# Patient Record
Sex: MALE | Race: WHITE | NOT HISPANIC OR LATINO | Employment: FULL TIME | URBAN - METROPOLITAN AREA
[De-identification: names, ages, dates, MRNs, and addresses within clinical notes are randomized per-mention and may not be internally consistent; named-entity substitution may affect disease eponyms.]

---

## 2017-12-07 ENCOUNTER — TELEPHONE (OUTPATIENT)
Dept: HEMATOLOGY/ONCOLOGY | Facility: CLINIC | Age: 42
End: 2017-12-07

## 2019-02-11 ENCOUNTER — TELEPHONE (OUTPATIENT)
Dept: HEMATOLOGY/ONCOLOGY | Facility: CLINIC | Age: 44
End: 2019-02-11

## 2019-02-11 NOTE — TELEPHONE ENCOUNTER
"Per Dr Martinez, ok to schedule this Wed 3pm.  Mr. Rodriguez agreed to sched.  Gave directions.  Per pt, he is establish care -- no issues at this time.  Mentioned to him that the records we have cover 1995, 1996 & 2003.  He said we are not missing anything.  9-27-95:  neck LN bx "favors Hodgkins"                 Had ABVD chemo then XRT from 4/9/96 to 5/8/96.  3-10-03   Follow up with  Onc, Dr Alex Maddox.  Last seen Sept 2002.                 Ordered PET for LN in neck.  3-23-03  Neck LN bx benign.    ===============================================    ----- Message -----  From: Libertad Colon  Sent: 2/11/2019  10:19 AM  To: Dirk Toney RN, [facility]    Pt is on the board here at [facility]. Requesting to see Dr. Tyler Martinez on either a Monday, Tues, or Wed from 2-5 is possible. He said that in 2017 he dropped off his records from  to either the nurse here or . He wanted to know if you all still had those on file? Please call him to schedule at [phone]. Thank you!        "

## 2019-02-13 ENCOUNTER — INITIAL CONSULT (OUTPATIENT)
Dept: HEMATOLOGY/ONCOLOGY | Facility: CLINIC | Age: 44
End: 2019-02-13
Payer: COMMERCIAL

## 2019-02-13 VITALS
DIASTOLIC BLOOD PRESSURE: 67 MMHG | SYSTOLIC BLOOD PRESSURE: 115 MMHG | OXYGEN SATURATION: 97 % | WEIGHT: 214.75 LBS | HEART RATE: 71 BPM | HEIGHT: 69 IN | BODY MASS INDEX: 31.81 KG/M2 | TEMPERATURE: 98 F

## 2019-02-13 DIAGNOSIS — C81.10 NODULAR SCLEROSING HODGKIN'S LYMPHOMA, UNSPECIFIED BODY REGION: ICD-10-CM

## 2019-02-13 DIAGNOSIS — Z92.3 HISTORY OF RADIATION EXPOSURE: ICD-10-CM

## 2019-02-13 DIAGNOSIS — Z92.21 HISTORY OF CHEMOTHERAPY: Primary | ICD-10-CM

## 2019-02-13 DIAGNOSIS — E03.9 HYPOTHYROIDISM, UNSPECIFIED TYPE: ICD-10-CM

## 2019-02-13 PROCEDURE — 99999 PR PBB SHADOW E&M-EST. PATIENT-LVL III: ICD-10-PCS | Mod: PBBFAC,,, | Performed by: INTERNAL MEDICINE

## 2019-02-13 PROCEDURE — 3008F BODY MASS INDEX DOCD: CPT | Mod: CPTII,S$GLB,, | Performed by: INTERNAL MEDICINE

## 2019-02-13 PROCEDURE — 3008F PR BODY MASS INDEX (BMI) DOCUMENTED: ICD-10-PCS | Mod: CPTII,S$GLB,, | Performed by: INTERNAL MEDICINE

## 2019-02-13 PROCEDURE — 99999 PR PBB SHADOW E&M-EST. PATIENT-LVL III: CPT | Mod: PBBFAC,,, | Performed by: INTERNAL MEDICINE

## 2019-02-13 PROCEDURE — 99205 OFFICE O/P NEW HI 60 MIN: CPT | Mod: S$GLB,,, | Performed by: INTERNAL MEDICINE

## 2019-02-13 PROCEDURE — 99205 PR OFFICE/OUTPT VISIT, NEW, LEVL V, 60-74 MIN: ICD-10-PCS | Mod: S$GLB,,, | Performed by: INTERNAL MEDICINE

## 2019-02-13 NOTE — PROGRESS NOTES
SECTION OF HEMATOLOGY AND BONE MARROW TRANSPLANT  New Patient Visit   02/15/2019  Referred by:  Moshe Self  Referred for:     CHIEF COMPLAINT:   Chief Complaint   Patient presents with    Lymphoma remission       HISTORY OF PRESENT ILLNESS:   43 y.o.  male pmh of NSCHL, stage IIB diagnosed in 1996; s/p 6 cycles ABVD followed by consolidative mediastinal XRT; his treatment was at [facility] (medical oncologist - Dr. Alex billy, radiation oncologist - Dr. Dillon North).  Relatively uncomplicated treatment course.  Was under serial observation since that time with no relapse.   He has post XRT hypothyroidism and is currently on synthroid.   Has had intermittent oncologic care since 5 years post malignancy .  Follows regularly with a PCP at , Dr. Ceballos.   Denies fever, chills, nightsweats, bleeding, brusing, lymphadenopathy, signs/symptoms of splenomegaly.   Denies any cardiorespiratory symptoms. Excercises.  Notes some weight gain due to less exercise related to pain from hernia surgery 4 years ago.  . .  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  Comes to clinic alone.    PAST MEDICAL HISTORY:   Past Medical History:   Diagnosis Date    Thyroid disease        PAST SURGICAL HISTORY:   Past Surgical History:   Procedure Laterality Date    HERNIA REPAIR         PAST SOCIAL HISTORY:   reports that  has never smoked. He does not have any smokeless tobacco history on file. He reports that he does not drink alcohol.    FAMILY HISTORY:  History reviewed. No pertinent family history.    CURRENT MEDICATIONS:   Current Outpatient Medications   Medication Sig    levothyroxine (SYNTHROID) 100 MCG tablet TK 1 T PO QD     No current facility-administered medications for this visit.      ALLERGIES:   Review of patient's allergies indicates:  No Known Allergies          REVIEW OF SYSTEMS:   General ROS: negative  Psychological ROS: negative  Ophthalmic ROS: negative  ENT ROS:  negative  Allergy and Immunology ROS: negative  Hematological and Lymphatic ROS: negative  Endocrine ROS: negative  Respiratory ROS: negative  Cardiovascular ROS: negative  Gastrointestinal ROS: negative  Genito-Urinary ROS: negative  Musculoskeletal ROS: see HPI  Neurological ROS: negative  Dermatological ROS: negative    PHYSICAL EXAM:   Vitals:    02/13/19 1603   BP: 115/67   Pulse: 71   Temp: 98.4 °F (36.9 °C)       General - well developed, well nourished, no apparent distress  Head & Face - no sinus tenderness  Eyes - normal conjunctivae and lids   ENT - normal external auditory canals and tympanic membranes bilaterally oropharynx clear,  Normal dentition and gums  Neck - normal thyroid  Chest and Lung - normal respiratory effort, clear to auscultation bilaterally   Cardiovascular - RRR with no MGR, normal S1 and S2; no pedal edema  Abdomen -  soft, nontender, no palpable hepatomegaly or splenomegaly  Lymph - no palpable lymphadenopathy  Extremities - unremarkable nails and digits  Heme - no bruising, petechiae, pallor  Skin - no rashes or lesions  Psych - appropriate mood and affect      ECOG Performance Status: (foot note - ECOG PS provided by Eastern Cooperative Oncology Group) 0 - Asymptomatic    Karnofsky Performance Score:  100%- Normal, No Complaints, No Evidence of Disease  DATA:   See epic media   [facility] labs show normal cbc, cmp, FLP, testosterone, thryoids studies, hgb a1c from feb 2018 ; due for annual   repeat this month     ASSESSMENT AND PLAN:   Encounter Diagnoses   Name Primary?    History of chemotherapy Yes    Hypothyroidism, unspecified type     Nodular sclerosing Hodgkin's lymphoma, unspecified body region     History of radiation exposure      -Remote history stage IIB NSCLH dx 1996 s/p ABVD x 6 and 4000cg XRT to med/supraclav area; clinical remission of his disease continues; explained he is cured of his disease  -long discussion at this point regarding need for monitoring for late term  chemotherapy and radiation toxicities, particularly cardiovascular disease and monitoring for therapy related myeloid disorders  -with regard to CV health discussed need to optimize modifiable CV risk factors (HTN, cholesterol, DM2, nonsmoker status, healthy weight, exercise/diet)  -he is active with no cardiorespiratory symptoms currently   -he had normal CBC feb 2018; recommend annual CBC for  therapy related myeloid disorders monitoring   -for his XRT related synthroid will continue current synthroid dose   -he has asked me to assume care of him for late effects monitoring /new pcp which I have agreed to do  -he is due for annual cbc, cmp, tsh, free t4, testosterone, FLP, and HgA1C    Follow Up: -labs as above to be drawn in next month  -same labs and MD appt in 1 year   Sarwat Martinez MD  Hematology/Oncology/Bone Marrow Transplant

## 2019-02-13 NOTE — PROGRESS NOTES
SECTION OF HEMATOLOGY AND BONE MARROW TRANSPLANT  New Patient Visit   02/13/2019  Referred by:  Moshe Self  Referred for: ***    CHIEF COMPLAINT: No chief complaint on file.      HISTORY OF PRESENT ILLNESS:     PAST MEDICAL HISTORY:   Past Medical History:   Diagnosis Date    Thyroid disease        PAST SURGICAL HISTORY:   Past Surgical History:   Procedure Laterality Date    HERNIA REPAIR         PAST SOCIAL HISTORY:   reports that  has never smoked. He does not have any smokeless tobacco history on file. He reports that he does not drink alcohol.    FAMILY HISTORY:  No family history on file.    CURRENT MEDICATIONS:   Current Outpatient Medications   Medication Sig    levothyroxine (SYNTHROID) 100 MCG tablet TK 1 T PO QD     No current facility-administered medications for this visit.      ALLERGIES:   Review of patient's allergies indicates:  No Known Allergies          REVIEW OF SYSTEMS:   General ROS: {rosgen:094075}  Psychological ROS: {ros psych:844872}  Ophthalmic ROS: {ros eyes:810560}  ENT ROS: {rosent:160448}  Allergy and Immunology ROS: {ros all/imm:567515}  Hematological and Lymphatic ROS: {rosheme/lymph:042659}  Endocrine ROS: {qi:093405}  Respiratory ROS: {ros resp:842937}  Cardiovascular ROS: {roscv:868126}  Gastrointestinal ROS: {ros gi:586211}  Genito-Urinary ROS: {rosgu:709652}  Musculoskeletal ROS: {ros musculoskeletal:258680}  Neurological ROS: {rosneuro:034570}  Dermatological ROS: {ros; skin:835202}    PHYSICAL EXAM:   There were no vitals filed for this visit.    General - well developed, well nourished, no apparent distress  Head & Face - no sinus tenderness  Eyes - normal conjunctivae and lids   ENT - normal external auditory canals and tympanic membranes bilaterally oropharynx clear,  Normal dentition and gums  Neck - normal thyroid  Chest and Lung - normal respiratory effort, clear to auscultation bilaterally   Cardiovascular - RRR with no MGR, normal S1 and S2; no pedal  edema  Abdomen -  soft, nontender, no palpable hepatomegaly or splenomegaly  Lymph - no palpable lymphadenopathy  Extremities - unremarkable nails and digits  Heme - no bruising, petechiae, pallor  Skin - no rashes or lesions  Psych - appropriate mood and affect      ECOG Performance Status: (foot note - ECOG PS provided by Eastern Cooperative Oncology Group) {performance status:19948}    Karnofsky Performance Score:  {KARNOFSKY SCORE:73180}  DATA:   {ID Data Review:88935}    ASSESSMENT AND PLAN:   No diagnosis found.    Follow Up: No Follow-up on file.    Sarwat Martinez MD  Hematology/Oncology/Bone Marrow Transplant

## 2019-05-02 ENCOUNTER — OFFICE VISIT (OUTPATIENT)
Dept: ENDOCRINOLOGY | Facility: CLINIC | Age: 44
End: 2019-05-02
Payer: COMMERCIAL

## 2019-05-02 VITALS
BODY MASS INDEX: 31.44 KG/M2 | DIASTOLIC BLOOD PRESSURE: 58 MMHG | HEART RATE: 92 BPM | HEIGHT: 69 IN | RESPIRATION RATE: 18 BRPM | WEIGHT: 212.31 LBS | SYSTOLIC BLOOD PRESSURE: 118 MMHG

## 2019-05-02 DIAGNOSIS — E03.9 ACQUIRED HYPOTHYROIDISM: Primary | ICD-10-CM

## 2019-05-02 DIAGNOSIS — G47.33 OBSTRUCTIVE SLEEP APNEA: ICD-10-CM

## 2019-05-02 DIAGNOSIS — E29.1 MALE HYPOGONADISM: ICD-10-CM

## 2019-05-02 DIAGNOSIS — K90.0 CELIAC DISEASE: ICD-10-CM

## 2019-05-02 PROCEDURE — 3008F BODY MASS INDEX DOCD: CPT | Mod: CPTII,S$GLB,, | Performed by: INTERNAL MEDICINE

## 2019-05-02 PROCEDURE — 3008F PR BODY MASS INDEX (BMI) DOCUMENTED: ICD-10-PCS | Mod: CPTII,S$GLB,, | Performed by: INTERNAL MEDICINE

## 2019-05-02 PROCEDURE — 99204 OFFICE O/P NEW MOD 45 MIN: CPT | Mod: S$GLB,,, | Performed by: INTERNAL MEDICINE

## 2019-05-02 PROCEDURE — 99999 PR PBB SHADOW E&M-EST. PATIENT-LVL III: CPT | Mod: PBBFAC,,, | Performed by: INTERNAL MEDICINE

## 2019-05-02 PROCEDURE — 99204 PR OFFICE/OUTPT VISIT, NEW, LEVL IV, 45-59 MIN: ICD-10-PCS | Mod: S$GLB,,, | Performed by: INTERNAL MEDICINE

## 2019-05-02 PROCEDURE — 99999 PR PBB SHADOW E&M-EST. PATIENT-LVL III: ICD-10-PCS | Mod: PBBFAC,,, | Performed by: INTERNAL MEDICINE

## 2019-05-02 RX ORDER — PANTOPRAZOLE SODIUM 20 MG/1
20 TABLET, DELAYED RELEASE ORAL DAILY
COMMUNITY
End: 2019-07-24

## 2019-05-02 NOTE — PROGRESS NOTES
Subjective:      Patient ID: Ivan Rodriguez is a 44 y.o. male.    Chief Complaint:  Hypothyroidism and Hypogonadism      History of Present Illness  Mr. Rodriguez presents for evaluation and management of hypothyroidism and hypogonadism.     Has active history of hypothyoridism, Celiac's disease and hypogonadism, and past history of nodular sclerosing Hodgkin's lymphoma that is cured.     NSCHL, stage IIB diagnosed in 1996; s/p 6 cycles ABVD followed by consolidative mediastinal XRT; his treatment was at [facility] (medical oncologist - Dr. Alex billy, radiation oncologist - Dr. Dillon North).    In regards to hypothyroidism:  Felt to be related to XRT therapy for treatment of Hodgkin's lymphoma. Has been on thyroid hormone for several years.   His current thyroid hormone dose is either 125 mcg or 137 mcg. He is unsure of the dose and will let me know.   He takes thyroid hormone on empty stomach and doesn't miss any doses.   Reports that his dose has escalated gradually over the past few years, coinciding with a more recent diagnosis of Celiac's disease. He is unsure if he was ever tested for Hashimoto's.    In regards to hypogonadism:  Diagnosis was made by Dr. Melara at . Reports having full workup there that included a pituitary MRI.   He was recently  and is actively trying to have children so he was started on Clomid.   Current Clomid dose is 50 mg once daily.   Recent T level off of Clomid was 300 and while on Clomid 528.  He reports that the hypogonadal symptoms started after he had complications from a hernia surgery and gained weight.   The symptoms are improved while on Clomid.  He also had semen analysis done which was normal.   No illicit drug use. No supplements or creams. No lavender or tea tree oil. Prolactin level was normal.     In regards to Celiac's disease:  Diagnosed by gastroenterology at  with small bowel biopsy and positive antibodies confirming diagnosis.   He has been doing his best  to adhere to a gluten free diet.     Also with CARLOS and wears CPAP regularly.     Review of Systems   Constitutional: Negative for unexpected weight change.   Eyes: Negative for visual disturbance.   Respiratory: Negative for shortness of breath.    Cardiovascular: Negative for chest pain.   Gastrointestinal: Negative for abdominal pain.   Endocrine: Negative for cold intolerance.   Genitourinary: Negative for frequency.   Musculoskeletal: Negative for arthralgias.   Skin: Negative for rash.   Neurological: Negative for headaches.       Objective:   Physical Exam   Constitutional: He is oriented to person, place, and time. He appears well-developed and well-nourished.   HENT:   Head: Normocephalic and atraumatic.   Right Ear: External ear normal.   Left Ear: External ear normal.   Nose: Nose normal.   Neck: No tracheal deviation present. No thyromegaly present.   Cardiovascular: Normal rate, regular rhythm and normal heart sounds.   No edema   Pulmonary/Chest: Effort normal and breath sounds normal.   Abdominal: Soft. Bowel sounds are normal. There is no tenderness.   Musculoskeletal:   Normal gait, no cyanosis or clubbing   Neurological: He is alert and oriented to person, place, and time. He has normal reflexes.   Skin: Skin is warm and dry. No rash noted.   No nodules, no ulcers   Psychiatric: He has a normal mood and affect. Judgment normal.   Vitals reviewed.      Lab Review:   Reviewed outside labs:  3/15/2019:  Estradiol 28 (nml)  FSH 4.1  Testosterone 300  Testosterone free 61.7  06/21/2018:  Cr 0.99  TSH 4.76  Testosterone 528  Prolactin 7.2  SHBG 17      Assessment:     1. Acquired hypothyroidism    2. Male hypogonadism    3. Celiac disease    4. Obstructive sleep apnea      Plan:     1.) Patient with hypothyroidism secondary to XRT  --Will check TPO to see if he also has Hashimoto's  --Will check TSH and FT4 and adjust dose if necessary  --Recent Celiac's diagnosis and malabsorption likely contributed to  recent dose increase    2.) Patient with male hypogonadism  --Initial evaluation was done at  and included a normal pituitary MRI  --Likely multifactorial due to XRT, chemotherapy, weight gain and CARLOS  --Will repeat T panel now  --Will plan to continue Clomid for now but will likely change dose to 25 mg every other day    3.) Patient to continue gluten free diet    4.) He is compliant with CPAP    RTC in 6 months    Rudolph Schafer M.D. Staff Endocrinology

## 2019-05-05 PROBLEM — K90.0 CELIAC DISEASE: Status: ACTIVE | Noted: 2019-05-05

## 2019-05-05 PROBLEM — G47.33 OBSTRUCTIVE SLEEP APNEA: Status: ACTIVE | Noted: 2019-05-05

## 2019-05-07 ENCOUNTER — LAB VISIT (OUTPATIENT)
Dept: LAB | Facility: HOSPITAL | Age: 44
End: 2019-05-07
Attending: INTERNAL MEDICINE
Payer: COMMERCIAL

## 2019-05-07 DIAGNOSIS — E29.1 MALE HYPOGONADISM: ICD-10-CM

## 2019-05-07 DIAGNOSIS — E03.9 ACQUIRED HYPOTHYROIDISM: ICD-10-CM

## 2019-05-07 LAB
25(OH)D3+25(OH)D2 SERPL-MCNC: 39 NG/ML (ref 30–96)
ALBUMIN SERPL BCP-MCNC: 3.7 G/DL (ref 3.5–5.2)
ALP SERPL-CCNC: 58 U/L (ref 55–135)
ALT SERPL W/O P-5'-P-CCNC: 25 U/L (ref 10–44)
ANION GAP SERPL CALC-SCNC: 10 MMOL/L (ref 8–16)
AST SERPL-CCNC: 23 U/L (ref 10–40)
BASOPHILS # BLD AUTO: 0.04 K/UL (ref 0–0.2)
BASOPHILS NFR BLD: 0.8 % (ref 0–1.9)
BILIRUB SERPL-MCNC: 0.7 MG/DL (ref 0.1–1)
BUN SERPL-MCNC: 14 MG/DL (ref 6–20)
CALCIUM SERPL-MCNC: 9 MG/DL (ref 8.7–10.5)
CHLORIDE SERPL-SCNC: 107 MMOL/L (ref 95–110)
CO2 SERPL-SCNC: 28 MMOL/L (ref 23–29)
CREAT SERPL-MCNC: 0.9 MG/DL (ref 0.5–1.4)
DIFFERENTIAL METHOD: ABNORMAL
EOSINOPHIL # BLD AUTO: 0.1 K/UL (ref 0–0.5)
EOSINOPHIL NFR BLD: 2.7 % (ref 0–8)
ERYTHROCYTE [DISTWIDTH] IN BLOOD BY AUTOMATED COUNT: 13.2 % (ref 11.5–14.5)
EST. GFR  (AFRICAN AMERICAN): >60 ML/MIN/1.73 M^2
EST. GFR  (NON AFRICAN AMERICAN): >60 ML/MIN/1.73 M^2
FSH SERPL-ACNC: 9.5 MIU/ML (ref 0.95–11.95)
GLUCOSE SERPL-MCNC: 119 MG/DL (ref 70–110)
HCT VFR BLD AUTO: 44.7 % (ref 40–54)
HGB BLD-MCNC: 14.3 G/DL (ref 14–18)
IMM GRANULOCYTES # BLD AUTO: 0.04 K/UL (ref 0–0.04)
IMM GRANULOCYTES NFR BLD AUTO: 0.8 % (ref 0–0.5)
LH SERPL-ACNC: 3.6 MIU/ML (ref 0.6–12.1)
LYMPHOCYTES # BLD AUTO: 1.9 K/UL (ref 1–4.8)
LYMPHOCYTES NFR BLD: 35.2 % (ref 18–48)
MCH RBC QN AUTO: 27.6 PG (ref 27–31)
MCHC RBC AUTO-ENTMCNC: 32 G/DL (ref 32–36)
MCV RBC AUTO: 86 FL (ref 82–98)
MONOCYTES # BLD AUTO: 0.5 K/UL (ref 0.3–1)
MONOCYTES NFR BLD: 8.7 % (ref 4–15)
NEUTROPHILS # BLD AUTO: 2.7 K/UL (ref 1.8–7.7)
NEUTROPHILS NFR BLD: 51.8 % (ref 38–73)
NRBC BLD-RTO: 0 /100 WBC
PLATELET # BLD AUTO: 232 K/UL (ref 150–350)
PMV BLD AUTO: 9.4 FL (ref 9.2–12.9)
POTASSIUM SERPL-SCNC: 4.4 MMOL/L (ref 3.5–5.1)
PROT SERPL-MCNC: 6.6 G/DL (ref 6–8.4)
RBC # BLD AUTO: 5.19 M/UL (ref 4.6–6.2)
SODIUM SERPL-SCNC: 145 MMOL/L (ref 136–145)
T4 FREE SERPL-MCNC: 0.93 NG/DL (ref 0.71–1.51)
THYROPEROXIDASE IGG SERPL-ACNC: <6 IU/ML
TSH SERPL DL<=0.005 MIU/L-ACNC: 2.69 UIU/ML (ref 0.4–4)
WBC # BLD AUTO: 5.28 K/UL (ref 3.9–12.7)

## 2019-05-07 PROCEDURE — 85025 COMPLETE CBC W/AUTO DIFF WBC: CPT

## 2019-05-07 PROCEDURE — 86376 MICROSOMAL ANTIBODY EACH: CPT

## 2019-05-07 PROCEDURE — 83002 ASSAY OF GONADOTROPIN (LH): CPT

## 2019-05-07 PROCEDURE — 84439 ASSAY OF FREE THYROXINE: CPT

## 2019-05-07 PROCEDURE — 84443 ASSAY THYROID STIM HORMONE: CPT

## 2019-05-07 PROCEDURE — 82306 VITAMIN D 25 HYDROXY: CPT

## 2019-05-07 PROCEDURE — 82040 ASSAY OF SERUM ALBUMIN: CPT

## 2019-05-07 PROCEDURE — 83001 ASSAY OF GONADOTROPIN (FSH): CPT

## 2019-05-07 PROCEDURE — 36415 COLL VENOUS BLD VENIPUNCTURE: CPT

## 2019-05-07 PROCEDURE — 80053 COMPREHEN METABOLIC PANEL: CPT

## 2019-05-08 ENCOUNTER — TELEPHONE (OUTPATIENT)
Dept: ENDOCRINOLOGY | Facility: CLINIC | Age: 44
End: 2019-05-08

## 2019-05-08 RX ORDER — LEVOTHYROXINE SODIUM 137 UG/1
137 TABLET ORAL
Qty: 30 TABLET | Refills: 11 | Status: SHIPPED | OUTPATIENT
Start: 2019-05-08 | End: 2019-07-19

## 2019-05-08 NOTE — TELEPHONE ENCOUNTER
Called patient and discussed results. TSH is mid normal range, but there is room to increase the dose so will increase Synthroid to 137 mcg once daily. Vitamin D, CBC and TPO normal. Fasting glucose is 119 and he has been told A1c is in pre diabetes range in the past. Recommended lifestyle modification at this point and will check A1c with next set of labs. Testosterone panel is pending.

## 2019-05-11 LAB
ALBUMIN SERPL-MCNC: 4.1 G/DL (ref 3.6–5.1)
SHBG SERPL-SCNC: 19 NMOL/L (ref 10–50)
TESTOST FREE SERPL-MCNC: 116.4 PG/ML (ref 46–224)
TESTOST SERPL-MCNC: 534 NG/DL (ref 250–1100)
TESTOSTERONE.FREE+WB SERPL-MCNC: 219.1 NG/DL (ref 110–575)

## 2019-05-13 ENCOUNTER — TELEPHONE (OUTPATIENT)
Dept: ENDOCRINOLOGY | Facility: CLINIC | Age: 44
End: 2019-05-13

## 2019-05-13 DIAGNOSIS — E29.1 MALE HYPOGONADISM: Primary | ICD-10-CM

## 2019-05-13 DIAGNOSIS — E03.9 ACQUIRED HYPOTHYROIDISM: ICD-10-CM

## 2019-05-13 RX ORDER — CLOMIPHENE CITRATE 50 MG/1
25 TABLET ORAL EVERY OTHER DAY
Qty: 24 TABLET | Refills: 3 | Status: SHIPPED | OUTPATIENT
Start: 2019-05-13 | End: 2019-05-23 | Stop reason: SDUPTHER

## 2019-05-13 NOTE — TELEPHONE ENCOUNTER
Called patient and discussed testosterone results. T levels are in an appropriate range on the current dose of Clomid. Will change Clomid to 25 mg every other day and repeat T levels in early AM in 8 weeks.

## 2019-05-14 ENCOUNTER — TELEPHONE (OUTPATIENT)
Dept: ENDOCRINOLOGY | Facility: CLINIC | Age: 44
End: 2019-05-14

## 2019-05-23 DIAGNOSIS — E03.9 ACQUIRED HYPOTHYROIDISM: Primary | ICD-10-CM

## 2019-05-23 RX ORDER — CLOMIPHENE CITRATE 50 MG/1
25 TABLET ORAL EVERY OTHER DAY
Qty: 24 TABLET | Refills: 3 | Status: SHIPPED | OUTPATIENT
Start: 2019-05-23 | End: 2020-05-20 | Stop reason: SDUPTHER

## 2019-07-16 ENCOUNTER — LAB VISIT (OUTPATIENT)
Dept: LAB | Facility: HOSPITAL | Age: 44
End: 2019-07-16
Attending: INTERNAL MEDICINE
Payer: COMMERCIAL

## 2019-07-16 DIAGNOSIS — E03.9 ACQUIRED HYPOTHYROIDISM: ICD-10-CM

## 2019-07-16 DIAGNOSIS — E29.1 MALE HYPOGONADISM: ICD-10-CM

## 2019-07-16 LAB
ESTIMATED AVG GLUCOSE: 123 MG/DL (ref 68–131)
HBA1C MFR BLD HPLC: 5.9 % (ref 4–5.6)
T4 FREE SERPL-MCNC: 0.97 NG/DL (ref 0.71–1.51)
TSH SERPL DL<=0.005 MIU/L-ACNC: 4.42 UIU/ML (ref 0.4–4)

## 2019-07-16 PROCEDURE — 84443 ASSAY THYROID STIM HORMONE: CPT

## 2019-07-16 PROCEDURE — 83036 HEMOGLOBIN GLYCOSYLATED A1C: CPT

## 2019-07-16 PROCEDURE — 84439 ASSAY OF FREE THYROXINE: CPT

## 2019-07-16 PROCEDURE — 82040 ASSAY OF SERUM ALBUMIN: CPT

## 2019-07-16 PROCEDURE — 36415 COLL VENOUS BLD VENIPUNCTURE: CPT

## 2019-07-19 ENCOUNTER — TELEPHONE (OUTPATIENT)
Dept: ENDOCRINOLOGY | Facility: CLINIC | Age: 44
End: 2019-07-19

## 2019-07-19 RX ORDER — LEVOTHYROXINE SODIUM 150 MCG
150 TABLET ORAL DAILY
Qty: 30 TABLET | Refills: 11 | Status: SHIPPED | OUTPATIENT
Start: 2019-07-19 | End: 2020-05-20 | Stop reason: SDUPTHER

## 2019-07-20 LAB
ALBUMIN SERPL-MCNC: 4.3 G/DL (ref 3.6–5.1)
SHBG SERPL-SCNC: 22 NMOL/L (ref 10–50)
TESTOST FREE SERPL-MCNC: 112.5 PG/ML (ref 46–224)
TESTOST SERPL-MCNC: 578 NG/DL (ref 250–1100)
TESTOSTERONE.FREE+WB SERPL-MCNC: 221.6 NG/DL (ref 110–575)

## 2019-07-22 ENCOUNTER — TELEPHONE (OUTPATIENT)
Dept: ENDOCRINOLOGY | Facility: CLINIC | Age: 44
End: 2019-07-22

## 2019-07-22 NOTE — TELEPHONE ENCOUNTER
Spoke with patient regarding labs. Informed patient TSH was 4.471 Dr Schafer would like patient to increase Synthyroid to 150mcg. Testosterone levels normal. Patient verbalized understanding.

## 2019-07-23 DIAGNOSIS — E03.9 ACQUIRED HYPOTHYROIDISM: Primary | ICD-10-CM

## 2019-07-24 ENCOUNTER — LAB VISIT (OUTPATIENT)
Dept: LAB | Facility: HOSPITAL | Age: 44
End: 2019-07-24
Attending: INTERNAL MEDICINE
Payer: COMMERCIAL

## 2019-07-24 ENCOUNTER — OFFICE VISIT (OUTPATIENT)
Dept: GASTROENTEROLOGY | Facility: CLINIC | Age: 44
End: 2019-07-24
Payer: COMMERCIAL

## 2019-07-24 VITALS
WEIGHT: 215.19 LBS | DIASTOLIC BLOOD PRESSURE: 75 MMHG | HEART RATE: 84 BPM | BODY MASS INDEX: 31.87 KG/M2 | SYSTOLIC BLOOD PRESSURE: 123 MMHG | HEIGHT: 69 IN

## 2019-07-24 DIAGNOSIS — K90.0 CELIAC DISEASE: ICD-10-CM

## 2019-07-24 DIAGNOSIS — Z87.11 HISTORY OF GASTRIC ULCER: ICD-10-CM

## 2019-07-24 DIAGNOSIS — K52.9 CHRONIC DIARRHEA: ICD-10-CM

## 2019-07-24 DIAGNOSIS — R14.0 BLOATING: ICD-10-CM

## 2019-07-24 DIAGNOSIS — K50.00 TERMINAL ILEITIS WITHOUT COMPLICATION: ICD-10-CM

## 2019-07-24 DIAGNOSIS — K90.0 CELIAC DISEASE: Primary | ICD-10-CM

## 2019-07-24 DIAGNOSIS — K29.30 CHRONIC SUPERFICIAL GASTRITIS WITHOUT BLEEDING: ICD-10-CM

## 2019-07-24 DIAGNOSIS — R14.0 ABDOMINAL DISTENSION: ICD-10-CM

## 2019-07-24 LAB
FERRITIN SERPL-MCNC: 138 NG/ML (ref 20–300)
FOLATE SERPL-MCNC: 7.3 NG/ML (ref 4–24)
INR PPP: 1 (ref 0.8–1.2)
IRON SERPL-MCNC: 48 UG/DL (ref 45–160)
PROTHROMBIN TIME: 10.1 SEC (ref 9–12.5)
SATURATED IRON: 11 % (ref 20–50)
TOTAL IRON BINDING CAPACITY: 438 UG/DL (ref 250–450)
TRANSFERRIN SERPL-MCNC: 296 MG/DL (ref 200–375)
VIT B12 SERPL-MCNC: 425 PG/ML (ref 210–950)

## 2019-07-24 PROCEDURE — 99204 PR OFFICE/OUTPT VISIT, NEW, LEVL IV, 45-59 MIN: ICD-10-PCS | Mod: S$GLB,,, | Performed by: INTERNAL MEDICINE

## 2019-07-24 PROCEDURE — 82380 ASSAY OF CAROTENE: CPT

## 2019-07-24 PROCEDURE — 84590 ASSAY OF VITAMIN A: CPT

## 2019-07-24 PROCEDURE — 3008F PR BODY MASS INDEX (BMI) DOCUMENTED: ICD-10-PCS | Mod: CPTII,S$GLB,, | Performed by: INTERNAL MEDICINE

## 2019-07-24 PROCEDURE — 82746 ASSAY OF FOLIC ACID SERUM: CPT

## 2019-07-24 PROCEDURE — 83516 IMMUNOASSAY NONANTIBODY: CPT | Mod: 59

## 2019-07-24 PROCEDURE — 36415 COLL VENOUS BLD VENIPUNCTURE: CPT

## 2019-07-24 PROCEDURE — 82607 VITAMIN B-12: CPT

## 2019-07-24 PROCEDURE — 3008F BODY MASS INDEX DOCD: CPT | Mod: CPTII,S$GLB,, | Performed by: INTERNAL MEDICINE

## 2019-07-24 PROCEDURE — 84446 ASSAY OF VITAMIN E: CPT

## 2019-07-24 PROCEDURE — 82728 ASSAY OF FERRITIN: CPT

## 2019-07-24 PROCEDURE — 84630 ASSAY OF ZINC: CPT

## 2019-07-24 PROCEDURE — 85610 PROTHROMBIN TIME: CPT

## 2019-07-24 PROCEDURE — 99204 OFFICE O/P NEW MOD 45 MIN: CPT | Mod: S$GLB,,, | Performed by: INTERNAL MEDICINE

## 2019-07-24 PROCEDURE — 99999 PR PBB SHADOW E&M-EST. PATIENT-LVL III: CPT | Mod: PBBFAC,,, | Performed by: INTERNAL MEDICINE

## 2019-07-24 PROCEDURE — 83540 ASSAY OF IRON: CPT

## 2019-07-24 PROCEDURE — 82525 ASSAY OF COPPER: CPT

## 2019-07-24 PROCEDURE — 99999 PR PBB SHADOW E&M-EST. PATIENT-LVL III: ICD-10-PCS | Mod: PBBFAC,,, | Performed by: INTERNAL MEDICINE

## 2019-07-24 NOTE — PROGRESS NOTES
[facility] Consultation Note    Reason for Consult:    Chief Complaint   Patient presents with    Bloated    Celiac Disease       PCP:   Aashish Ceja    Referring MD:  Moshe Self  No address on file    HPI:  Ivan Rodriguez is a 44 y.o. male here for evaluation of bloating, gas, and abdominal distention.  He is new to our clinic.  He has a former patient of Dr. Dayana Mar he had been for the last 3 years.  She had diagnosed him with celiac disease. He provides records which I have personally reviewed during this visit.  There also some records available in our system.  He is followed in our system by Endocrinology (Dr. Schafer) for hypogonadism and hematology/oncology (Dr. Tyler Martinez) for history of NSCHL, stage IIB diagnosed in 1996; s/p 6 cycles ABVD followed by consolidative mediastinal XRT.    He started having these abdominal symptoms following an inguinal hernia repair 6 years ago.  He has also had left groin pain ever since then.  His grown pain is different than the pain she experiences associated with the abdominal distention and gas.  The symptoms are more generalized.  He also has intermittent diarrhea that occurs few times a week.  He feels that his diarrhea may be somewhat related to diet.  He has some improvement in his abdominal symptoms with bowel movements.  Around the time that his symptoms developed, he was also taking a significant amount of anti-inflammatories that included both De La Torre 1 and De La Torre 2 inhibitors.  He was found to have stomach ulcers and was placed on a PPI and then avoided NSAIDs.  Symptoms did improve some with that.    Records indicate that he had a recent EGD 02/18/2019 by Dr. Mar to follow up on ulcers and further evaluate the symptoms.  Findings were that of an irregular Z-line at 40 cm which was sampled; diffuse erythema and congestion in the stomach which was also sampled; and patchy erythema, congestion, granularity and  friability of the duodenal mucosa that extended to the 3rd portion for which biopsies were also done.  Biopsies of the Z-line showed chronic gastro esophagitis without evidence of Tai's.  Biopsies of the stomach showed mild chronic gastritis with a Giemsa stain negative for H pylori.  Duodenal biopsies showed chronic enteritis with microscopic description of villous edema and congestion, gastric surface cell metaplasia, and focal mild villous blunting.  His last clinic visit with Dr. Mar was in March.  At that time she was weaning him off of AcipHex.  He is no longer taking that at this time.  He denies any heartburn or regurgitation symptoms.  She advised a gluten free diet.  He has had trouble sticking to a gluten free diet.  He is not on a strict gluten free diet at this time.  He does get fatigued after eating gluten products.  Denies weight loss or ulcers in the mouth.  He also denies rashes. He admits to arthralgias in his back, arms, knees, and elbows after ingestion of gluten.    Colonoscopy 01/20/2017 done by Dr. Mar for abdominal pain. The exam was to the cecum with intubation of the terminal ileum.  Bowel preparation was good.  Findings indicated normal terminal ileal mucosa and biopsies were done.  The colon also appeared visually normal and random samples were taken throughout the colon.  Internal hemorrhoids were noted. Biopsy results from the terminal ileum revealed mild nonspecific acute and chronic inflammation.  There was no villous atrophy, but there were increased intraepithelial lymphocytes.  No crypt abscesses or granulomas were seen. The colon biopsies were completely normal.    Blood tests done November 10, 2016 revealed the following:  Deamidated gliadin IgG 6 (<20) and IgA 11 (<20)  Tissue transglutaminase IgG 10 (<6) and IgA less than 1  Endomysial antibody IgA negative  Total IgA 220  HLA DQ2 positive for variant  HLA DQ8 negative for variant    Ultrasound of the abdomen  "12/09/2016:  Mild hepatic steatosis with focal fatty sparing  Normal gallbladder and biliary system.          ROS:  Constitutional: No fevers, chills, No weight loss, normal appetite  CV: No chest pain or palpitations  Pulm: No cough, No shortness of breath  Ophtho: No vision changes or pain  GI: see HPI  Derm: No rash or lesions  Heme: No lymphadenopathy, No bruising  MSK:  As per HPI  : No dysuria, No frequent urination        Medical History:  has a past medical history of Hypothyroidism and Thyroid disease.    Surgical History:  has a past surgical history that includes Hernia repair and Lymph node biopsy (Right, 2003).    Family History: family history includes Kidney disease in his father.    Social History:  reports that he has never smoked. He does not have any smokeless tobacco history on file. He reports that he does not drink alcohol or use drugs.    Review of patient's allergies indicates:  No Known Allergies    Prior to Admission medications    Medication Sig Start Date End Date Taking? Authorizing Provider   clomiPHENE (CLOMID) 50 mg tablet Take 0.5 tablets (25 mg total) by mouth every other day. 5/23/19  Yes Rudolph Schafer MD   SYNTHROID 150 mcg tablet Take 1 tablet (150 mcg total) by mouth once daily. Brand name medically necessary 7/19/19 7/18/20 Yes Rudolph Schafer MD   pantoprazole (PROTONIX) 20 MG tablet Take 20 mg by mouth once daily.  7/24/19  Historical Provider, MD       Objective Findings:  Vital Signs:  /75   Pulse 84   Ht 5' 9" (1.753 m)   Wt 97.6 kg (215 lb 2.7 oz)   BMI 31.77 kg/m²   Body mass index is 31.77 kg/m².    Physical Exam:  General Appearance:  Well appearing in no acute distress, appears stated age  Head:  Normocephalic, atraumatic  Eyes:  No scleral icterus or pallor, EOMI  Skin:  No rash        Labs:  Lab Results   Component Value Date    WBC 5.28 05/07/2019    HGB 14.3 05/07/2019    HCT 44.7 05/07/2019    MCV 86 05/07/2019    RDW 13.2 05/07/2019     " 05/07/2019    GRAN 2.7 05/07/2019    GRAN 51.8 05/07/2019    LYMPH 1.9 05/07/2019    LYMPH 35.2 05/07/2019    MONO 0.5 05/07/2019    MONO 8.7 05/07/2019    EOS 0.1 05/07/2019    BASO 0.04 05/07/2019     Lab Results   Component Value Date     05/07/2019    K 4.4 05/07/2019     05/07/2019    CO2 28 05/07/2019     (H) 05/07/2019    BUN 14 05/07/2019    CREATININE 0.9 05/07/2019    CALCIUM 9.0 05/07/2019    PROT 6.6 05/07/2019    ALBUMIN 4.3 07/16/2019    BILITOT 0.7 05/07/2019    ALKPHOS 58 05/07/2019    AST 23 05/07/2019    ALT 25 05/07/2019           Imaging:  See HPI            Assessment:  Ivan Rodriguez is a 44 y.o. male with:  1. Celiac disease    2. Bloating    3. Abdominal distension    4. Chronic diarrhea    5. History of gastric ulcer    6. Terminal ileitis without complication    7. Chronic superficial gastritis without bleeding      He has ongoing symptoms of abdominal distention, gas, bloating, and diarrhea that all appear to be consistent with celiac disease. 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  He had duodenal inflammation with villous blunting in the setting of a positive TTG.  He was not on NSAIDs for this test.  No signs of infection such as H pylori was seen. He also had inflammation seen in his terminal ileum in 2017 which may also be related to this.  His symptoms fit a malabsorption of issue.  He is not on a strict gluten free diet.  Other possibilities could be small intestinal bacterial overgrowth or irritable bowel syndrome with diarrhea, but it will be difficult to differentiate these from celiac if he is not on a gluten free diet.  He may also have a component of lactose intolerance.      Recommendations/Plan:  1.  I will get a celiac serology panel.  A positive test would help to further support celiac disease. I may also use this for disease monitoring.  2.  I will also check for vitamin and mineral deficiencies as noted below.  Any deficiencies  may support malabsorption and would need to be repleted.  3.  I advised him regarding a strict gluten free diet.  If he improves, then we will continue with this.  If he does not improve, we will need to assess to see if this is ongoing celiac verses small intestinal bacterial overgrowth or irritable bowel syndrome.      Follow-up in 3 months.      Order summary:  Orders Placed This Encounter    Celiac Disease Panel    Ferritin    COPPER, SERUM    Zinc    CARNITINE, PLASMA    CAROTENE, SERUM    Vitamin A    Vitamin E    Vitamin B12    Folate    Iron and TIBC    Protime-INR       Medical decision making:  Visit time 45 min with more than 50% of time spent counseling on celiac disease as well as differential diagnosis and gluten free diet.        Thank you so much for allowing me to participate in the care of Ivan Rodriguez MD

## 2019-07-26 LAB
GLIADIN PEPTIDE IGA SER-ACNC: 5 UNITS
GLIADIN PEPTIDE IGG SER-ACNC: 5 UNITS
IGA SERPL-MCNC: 188 MG/DL (ref 70–400)
TTG IGA SER-ACNC: 4 UNITS
TTG IGG SER-ACNC: 5 UNITS

## 2019-07-27 LAB
A-TOCOPHEROL VIT E SERPL-MCNC: 1490 UG/DL (ref 500–1800)
CAROTENE SERPL-MCNC: 45 UG/DL (ref 60–200)
VIT A SERPL-MCNC: 71 UG/DL (ref 38–106)

## 2019-08-08 ENCOUNTER — TELEPHONE (OUTPATIENT)
Dept: GASTROENTEROLOGY | Facility: CLINIC | Age: 44
End: 2019-08-08

## 2019-08-08 NOTE — TELEPHONE ENCOUNTER
Ma spoke to pt,     Pt requesting lab results   Date: 7/24      ----- Message from Daja Flaherty sent at 8/8/2019  3:51 PM CDT -----  Contact: self@[email or web address]  Type:  Test Results    Who Called:  Patient Name of Test (Lab/Mammo/Etc):  Labs  Date of Test:  7/24/19  Ordering Provider:    Where the test was performed:  [facility]  Best Call Back Number:  [phone]  Additional Information:

## 2019-08-09 NOTE — TELEPHONE ENCOUNTER
Spoke with the patient by phone.  Discussed lab results.  Celiac markers are normal.  Only vitamin slightly low at this time is carotene, and not sure what the clinical significance is in relationship to possible Celiac.  Still waiting on carnitine, copper, and zinc levels.      The diagnosis of Celiac is questionable at this time.  EGD with small bowel biopsies is needed to further characterize what is going on.  He would like to hold off on scheduling EGD at this time since he will be having surgery to remove his inguinal hernia mesh.  He will call back after that for orders and scheduling, if he feels up to it.

## 2019-10-18 ENCOUNTER — OFFICE VISIT (OUTPATIENT)
Dept: GASTROENTEROLOGY | Facility: CLINIC | Age: 44
End: 2019-10-18
Payer: COMMERCIAL

## 2019-10-18 VITALS
WEIGHT: 223.75 LBS | DIASTOLIC BLOOD PRESSURE: 86 MMHG | BODY MASS INDEX: 33.14 KG/M2 | HEIGHT: 69 IN | HEART RATE: 74 BPM | SYSTOLIC BLOOD PRESSURE: 143 MMHG

## 2019-10-18 DIAGNOSIS — K21.9 GASTROESOPHAGEAL REFLUX DISEASE, ESOPHAGITIS PRESENCE NOT SPECIFIED: Primary | ICD-10-CM

## 2019-10-18 DIAGNOSIS — K90.0 CELIAC DISEASE: ICD-10-CM

## 2019-10-18 PROCEDURE — 99214 PR OFFICE/OUTPT VISIT, EST, LEVL IV, 30-39 MIN: ICD-10-PCS | Mod: S$GLB,,, | Performed by: INTERNAL MEDICINE

## 2019-10-18 PROCEDURE — 3008F BODY MASS INDEX DOCD: CPT | Mod: CPTII,S$GLB,, | Performed by: INTERNAL MEDICINE

## 2019-10-18 PROCEDURE — 3008F PR BODY MASS INDEX (BMI) DOCUMENTED: ICD-10-PCS | Mod: CPTII,S$GLB,, | Performed by: INTERNAL MEDICINE

## 2019-10-18 PROCEDURE — 99214 OFFICE O/P EST MOD 30 MIN: CPT | Mod: S$GLB,,, | Performed by: INTERNAL MEDICINE

## 2019-10-18 PROCEDURE — 99999 PR PBB SHADOW E&M-EST. PATIENT-LVL III: CPT | Mod: PBBFAC,,, | Performed by: INTERNAL MEDICINE

## 2019-10-18 PROCEDURE — 99999 PR PBB SHADOW E&M-EST. PATIENT-LVL III: ICD-10-PCS | Mod: PBBFAC,,, | Performed by: INTERNAL MEDICINE

## 2019-10-18 RX ORDER — RABEPRAZOLE SODIUM 20 MG/1
TABLET, DELAYED RELEASE ORAL
Refills: 1 | COMMUNITY
Start: 2019-10-03 | End: 2019-10-18 | Stop reason: SDUPTHER

## 2019-10-18 RX ORDER — RABEPRAZOLE SODIUM 20 MG/1
20 TABLET, DELAYED RELEASE ORAL DAILY
Qty: 90 TABLET | Refills: 3 | Status: SHIPPED | OUTPATIENT
Start: 2019-10-18 | End: 2020-09-29

## 2019-10-18 NOTE — PROGRESS NOTES
"   [facility] Established Patient Visit    Reason for Visit:    Chief Complaint   Patient presents with    Follow-up       PCP: Aashish Ceja    HPI:  Ivan Rodriguez is a 44 y.o. male here for follow-up of suspected celiac disease. I last saw him in July as a new patient. Since that time, he had evaluation by Dr. Pedraza at [facility] in August for hernia mesh removal.  He had partial resection of the mesh after which time he has felt much better.  His bloating, diarrhea and abnormal bowel movements resolved after the mesh removal.  He is having reflux; therefore, he restarted AcipHex.  Symptoms resolved with this, but he has been unable to stop it without symptoms returning.  He was having throat symptoms and cough as well.    No new complaints.          PMHX:  has a past medical history of Hypothyroidism and Thyroid disease.    PSHX:  has a past surgical history that includes Hernia repair; Lymph node biopsy (Right, 2003); and Hernia mesh removal (09/2019).    The patient's social and family histories were reviewed by me and updated in the appropriate section of the electronic medical record.    Review of patient's allergies indicates:  No Known Allergies    Prior to Admission medications    Medication Sig Start Date End Date Taking? Authorizing Provider   clomiPHENE (CLOMID) 50 mg tablet Take 0.5 tablets (25 mg total) by mouth every other day. 5/23/19  Yes Rudolph Schafer MD   RABEprazole (ACIPHEX) 20 mg tablet Take 1 tablet (20 mg total) by mouth once daily. 10/18/19 10/17/20 Yes Mohan Rodriguez MD   SYNTHROID 150 mcg tablet Take 1 tablet (150 mcg total) by mouth once daily. Brand name medically necessary 7/19/19 7/18/20 Yes Rudolph Schafer MD   RABEprazole (ACIPHEX) 20 mg tablet TK 1 T PO QD 10/3/19 10/18/19 Yes Historical Provider, MD         Objective Findings:  Vital Signs:  BP (!) 143/86 (BP Location: Left arm)   Pulse 74   Ht 5' 9" (1.753 m)   Wt 101.5 kg (223 lb 12.3 oz)   BMI " 33.04 kg/m²  Body mass index is 33.04 kg/m².    Physical Exam:  General Appearance:  Well appearing in no acute distress, appears stated age        Labs:     Ref. Range 7/24/2019 14:09   Iron Latest Ref Range: 45 - 160 ug/dL 48   TIBC Latest Ref Range: 250 - 450 ug/dL 438   Saturated Iron Latest Ref Range: 20 - 50 % 11 (L)   Transferrin Latest Ref Range: 200 - 375 mg/dL 296   Ferritin Latest Ref Range: 20.0 - 300.0 ng/mL 138   Folate Latest Ref Range: 4.0 - 24.0 ng/mL 7.3   Vitamin B-12 Latest Ref Range: 210 - 950 pg/mL 425   Protime Latest Ref Range: 9.0 - 12.5 sec 10.1   Coumadin Monitoring INR Latest Ref Range: 0.8 - 1.2  1.0      Ref. Range 7/24/2019 14:09   Carotene Latest Ref Range: 60 - 200 ug/dL 45 (L)   Retinol, serum Latest Ref Range: 38 - 106 ug/dL 71   Vitamin B-12 Latest Ref Range: 210 - 950 pg/mL 425   Vitamin E Latest Ref Range: 500 - 1800 ug/dL 1490      Ref. Range 7/24/2019 14:09   Antigliadin Ab IgG Latest Ref Range: <20 UNITS 5   Antigliadin Abs, IgA Latest Ref Range: <20 UNITS 5   TTG IgA Latest Ref Range: <20 UNITS 4   TTG IgG Latest Ref Range: <20 UNITS 5   Immunoglobulin A (IgA) Latest Ref Range: 70 - 400 mg/dL 188                     Assessment:  Ivan Rodriguez is a 44 y.o. male here with:  1. Gastroesophageal reflux disease, esophagitis presence not specified    2. Celiac disease      GERD that improves with use of AcipHex.  No alarm symptoms.    Suspected celiac disease; however, recent celiac serology was completely normal. His bowel symptoms completely resolved after our partial resection of a hernia mesh.  It is unclear to me how this was related to his bowel symptoms, but seems quite a coincidence.  At this time I am questioning the diagnosis of celiac disease. He is not on a gluten free diet.        Recommendations:  1.  We discussed lifestyle changes related to GERD.  2.  Continue AcipHex  3.  Will arrange for EGD.  Will get small bowel biopsies with that.    Follow-up pending  endoscopy      Medical decision making:  Visit time 25 min with more than 50% of time spent counseling on celiac disease, diagnosis, evaluation, and treatment for GERD.        Mohan Rodriguez MD

## 2020-04-01 ENCOUNTER — TELEPHONE (OUTPATIENT)
Dept: HEMATOLOGY/ONCOLOGY | Facility: CLINIC | Age: 45
End: 2020-04-01

## 2020-04-15 ENCOUNTER — TELEPHONE (OUTPATIENT)
Dept: HEMATOLOGY/ONCOLOGY | Facility: CLINIC | Age: 45
End: 2020-04-15

## 2020-04-23 DIAGNOSIS — Z00.00 PREVENTATIVE HEALTH CARE: Primary | ICD-10-CM

## 2020-04-24 ENCOUNTER — LAB VISIT (OUTPATIENT)
Dept: LAB | Facility: HOSPITAL | Age: 45
End: 2020-04-24
Attending: INTERNAL MEDICINE
Payer: COMMERCIAL

## 2020-04-24 DIAGNOSIS — Z00.00 PREVENTATIVE HEALTH CARE: ICD-10-CM

## 2020-04-24 LAB — SARS-COV-2 IGG SERPL QL IA: NEGATIVE

## 2020-04-24 PROCEDURE — 86769 SARS-COV-2 COVID-19 ANTIBODY: CPT

## 2020-04-24 PROCEDURE — 36415 COLL VENOUS BLD VENIPUNCTURE: CPT

## 2020-05-14 ENCOUNTER — TELEPHONE (OUTPATIENT)
Dept: ENDOCRINOLOGY | Facility: CLINIC | Age: 45
End: 2020-05-14

## 2020-05-14 DIAGNOSIS — E29.1 MALE HYPOGONADISM: Primary | ICD-10-CM

## 2020-05-14 DIAGNOSIS — E03.9 ACQUIRED HYPOTHYROIDISM: ICD-10-CM

## 2020-05-20 DIAGNOSIS — E03.9 ACQUIRED HYPOTHYROIDISM: ICD-10-CM

## 2020-05-20 DIAGNOSIS — E29.1 MALE HYPOGONADISM: Primary | ICD-10-CM

## 2020-05-20 RX ORDER — CLOMIPHENE CITRATE 50 MG/1
25 TABLET ORAL EVERY OTHER DAY
Qty: 24 TABLET | Refills: 3 | Status: SHIPPED | OUTPATIENT
Start: 2020-05-20 | End: 2020-07-16 | Stop reason: SDUPTHER

## 2020-05-20 RX ORDER — LEVOTHYROXINE SODIUM 150 MCG
150 TABLET ORAL DAILY
Qty: 30 TABLET | Refills: 11 | Status: SHIPPED | OUTPATIENT
Start: 2020-05-20 | End: 2020-07-16 | Stop reason: SDUPTHER

## 2020-06-23 ENCOUNTER — CLINICAL SUPPORT (OUTPATIENT)
Dept: URGENT CARE | Facility: CLINIC | Age: 45
End: 2020-06-23
Payer: COMMERCIAL

## 2020-06-23 PROCEDURE — U0003 INFECTIOUS AGENT DETECTION BY NUCLEIC ACID (DNA OR RNA); SEVERE ACUTE RESPIRATORY SYNDROME CORONAVIRUS 2 (SARS-COV-2) (CORONAVIRUS DISEASE [COVID-19]), AMPLIFIED PROBE TECHNIQUE, MAKING USE OF HIGH THROUGHPUT TECHNOLOGIES AS DESCRIBED BY CMS-2020-01-R: HCPCS

## 2020-06-25 LAB — SARS-COV-2 RNA RESP QL NAA+PROBE: NOT DETECTED

## 2020-07-10 ENCOUNTER — LAB VISIT (OUTPATIENT)
Dept: LAB | Facility: HOSPITAL | Age: 45
End: 2020-07-10
Attending: INTERNAL MEDICINE
Payer: COMMERCIAL

## 2020-07-10 DIAGNOSIS — E03.9 ACQUIRED HYPOTHYROIDISM: ICD-10-CM

## 2020-07-10 DIAGNOSIS — E29.1 MALE HYPOGONADISM: ICD-10-CM

## 2020-07-10 LAB
ALBUMIN SERPL BCP-MCNC: 4.2 G/DL (ref 3.5–5.2)
ALP SERPL-CCNC: 74 U/L (ref 55–135)
ALT SERPL W/O P-5'-P-CCNC: 37 U/L (ref 10–44)
ANION GAP SERPL CALC-SCNC: 11 MMOL/L (ref 8–16)
AST SERPL-CCNC: 36 U/L (ref 10–40)
BASOPHILS # BLD AUTO: 0.03 K/UL (ref 0–0.2)
BASOPHILS NFR BLD: 0.4 % (ref 0–1.9)
BILIRUB SERPL-MCNC: 0.8 MG/DL (ref 0.1–1)
BUN SERPL-MCNC: 16 MG/DL (ref 6–20)
CALCIUM SERPL-MCNC: 9.4 MG/DL (ref 8.7–10.5)
CHLORIDE SERPL-SCNC: 104 MMOL/L (ref 95–110)
CO2 SERPL-SCNC: 25 MMOL/L (ref 23–29)
CREAT SERPL-MCNC: 1.2 MG/DL (ref 0.5–1.4)
DIFFERENTIAL METHOD: NORMAL
EOSINOPHIL # BLD AUTO: 0.1 K/UL (ref 0–0.5)
EOSINOPHIL NFR BLD: 1.2 % (ref 0–8)
ERYTHROCYTE [DISTWIDTH] IN BLOOD BY AUTOMATED COUNT: 13.3 % (ref 11.5–14.5)
EST. GFR  (AFRICAN AMERICAN): >60 ML/MIN/1.73 M^2
EST. GFR  (NON AFRICAN AMERICAN): >60 ML/MIN/1.73 M^2
ESTIMATED AVG GLUCOSE: 126 MG/DL (ref 68–131)
FSH SERPL-ACNC: 8.1 MIU/ML (ref 0.95–11.95)
GLUCOSE SERPL-MCNC: 114 MG/DL (ref 70–110)
HBA1C MFR BLD HPLC: 6 % (ref 4–5.6)
HCT VFR BLD AUTO: 44.6 % (ref 40–54)
HGB BLD-MCNC: 14.5 G/DL (ref 14–18)
IMM GRANULOCYTES # BLD AUTO: 0.02 K/UL (ref 0–0.04)
IMM GRANULOCYTES NFR BLD AUTO: 0.2 % (ref 0–0.5)
LYMPHOCYTES # BLD AUTO: 2.5 K/UL (ref 1–4.8)
LYMPHOCYTES NFR BLD: 30.1 % (ref 18–48)
MCH RBC QN AUTO: 28.6 PG (ref 27–31)
MCHC RBC AUTO-ENTMCNC: 32.5 G/DL (ref 32–36)
MCV RBC AUTO: 88 FL (ref 82–98)
MONOCYTES # BLD AUTO: 0.7 K/UL (ref 0.3–1)
MONOCYTES NFR BLD: 8.9 % (ref 4–15)
NEUTROPHILS # BLD AUTO: 4.8 K/UL (ref 1.8–7.7)
NEUTROPHILS NFR BLD: 59.2 % (ref 38–73)
NRBC BLD-RTO: 0 /100 WBC
PLATELET # BLD AUTO: 255 K/UL (ref 150–350)
PMV BLD AUTO: 10.7 FL (ref 9.2–12.9)
POTASSIUM SERPL-SCNC: 3.9 MMOL/L (ref 3.5–5.1)
PROT SERPL-MCNC: 8.4 G/DL (ref 6–8.4)
RBC # BLD AUTO: 5.07 M/UL (ref 4.6–6.2)
SODIUM SERPL-SCNC: 140 MMOL/L (ref 136–145)
T4 FREE SERPL-MCNC: 0.96 NG/DL (ref 0.71–1.51)
TSH SERPL DL<=0.005 MIU/L-ACNC: 1.71 UIU/ML (ref 0.4–4)
WBC # BLD AUTO: 8.18 K/UL (ref 3.9–12.7)

## 2020-07-10 PROCEDURE — 83001 ASSAY OF GONADOTROPIN (FSH): CPT

## 2020-07-10 PROCEDURE — 82040 ASSAY OF SERUM ALBUMIN: CPT

## 2020-07-10 PROCEDURE — 80053 COMPREHEN METABOLIC PANEL: CPT

## 2020-07-10 PROCEDURE — 85025 COMPLETE CBC W/AUTO DIFF WBC: CPT

## 2020-07-10 PROCEDURE — 84439 ASSAY OF FREE THYROXINE: CPT

## 2020-07-10 PROCEDURE — 83036 HEMOGLOBIN GLYCOSYLATED A1C: CPT

## 2020-07-10 PROCEDURE — 84443 ASSAY THYROID STIM HORMONE: CPT

## 2020-07-16 ENCOUNTER — OFFICE VISIT (OUTPATIENT)
Dept: ENDOCRINOLOGY | Facility: CLINIC | Age: 45
End: 2020-07-16
Payer: COMMERCIAL

## 2020-07-16 VITALS — SYSTOLIC BLOOD PRESSURE: 122 MMHG | DIASTOLIC BLOOD PRESSURE: 80 MMHG | HEART RATE: 68 BPM

## 2020-07-16 DIAGNOSIS — K90.0 CELIAC DISEASE: ICD-10-CM

## 2020-07-16 DIAGNOSIS — E29.1 MALE HYPOGONADISM: ICD-10-CM

## 2020-07-16 DIAGNOSIS — E03.9 ACQUIRED HYPOTHYROIDISM: Primary | ICD-10-CM

## 2020-07-16 DIAGNOSIS — G47.33 OBSTRUCTIVE SLEEP APNEA: ICD-10-CM

## 2020-07-16 PROCEDURE — 99999 PR PBB SHADOW E&M-EST. PATIENT-LVL II: CPT | Mod: PBBFAC,,, | Performed by: INTERNAL MEDICINE

## 2020-07-16 PROCEDURE — 99999 PR PBB SHADOW E&M-EST. PATIENT-LVL II: ICD-10-PCS | Mod: PBBFAC,,, | Performed by: INTERNAL MEDICINE

## 2020-07-16 PROCEDURE — 99214 OFFICE O/P EST MOD 30 MIN: CPT | Mod: S$GLB,,, | Performed by: INTERNAL MEDICINE

## 2020-07-16 PROCEDURE — 99214 PR OFFICE/OUTPT VISIT, EST, LEVL IV, 30-39 MIN: ICD-10-PCS | Mod: S$GLB,,, | Performed by: INTERNAL MEDICINE

## 2020-07-16 RX ORDER — CLOMIPHENE CITRATE 50 MG/1
50 TABLET ORAL EVERY OTHER DAY
Qty: 45 TABLET | Refills: 3 | Status: SHIPPED | OUTPATIENT
Start: 2020-07-16 | End: 2021-02-02 | Stop reason: SDUPTHER

## 2020-07-16 RX ORDER — LEVOTHYROXINE SODIUM 150 MCG
150 TABLET ORAL DAILY
Qty: 30 TABLET | Refills: 11 | Status: SHIPPED | OUTPATIENT
Start: 2020-07-16 | End: 2021-02-02 | Stop reason: SDUPTHER

## 2020-07-16 NOTE — PROGRESS NOTES
Subjective:      Patient ID: Ivan Rodriguez is a 45 y.o. male.    Chief Complaint:  Hypogonadism and Hypothyroidism      History of Present Illness  Mr. Rodriguez presents for follow up of hypothyroidism and hypogonadism. Last visit 5/2019.     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      NSCHL, stage IIB diagnosed in 1996; s/p 6 cycles ABVD followed by consolidative mediastinal XRT; his treatment was at [facility] (medical oncologist - Dr. Alex Maddox, radiation oncologist - Dr. Dillon North).     In regards to hypothyroidism:  Felt to be related to XRT therapy for treatment of Hodgkin's lymphoma. Has been on thyroid hormone for several years.   His current thyroid hormone dose is 150 mcg once daily of brand name Synthroid.   He takes thyroid hormone on empty stomach and doesn't miss any doses.   Results for IVAN RODRIGUEZ (MRN [id number]) as of 7/17/2020 12:25   Ref. Range 7/10/2020 13:18   TSH Latest Ref Range: 0.400 - 4.000 uIU/mL 1.712       In regards to hypogonadism:  Diagnosis was made by Dr. Melara at . Reports having full workup there that included a pituitary MRI.   He was recently  and was actively trying to have children so he was started on Clomid. His wife is currently 6 weeks pregnant.   Current Clomid dose is 50 mg every other day  Pervious total T level off of Clomid was 300.  He reports that the hypogonadal symptoms started after he had complications from a hernia surgery and gained weight.   The symptoms are improved while on Clomid, but he has noticed some decreased libido again.   He also had semen analysis done which was normal.   No illicit drug use. No supplements or creams. No lavender or tea tree oil. Prolactin level was normal.   Results for IVAN RODRIGUEZ (MRN [id number]) as of 7/17/2020 12:25   Ref. Range 7/10/2020 13:18   FSH Latest Ref Range: 0.95 - 11.95 mIU/mL 8.10   Sex  Hormone Binding Globulin Latest Ref Range: 10 - 50 nmol/L 27   Testosterone Latest Ref Range: 250 - 1100 ng/dL 449   Testosterone, Bioavailable Latest Ref Range: 110.0 - 575.0 ng/dL 140.3   Testosterone, Free Latest Ref Range: 46.0 - 224.0 pg/mL 71.2        In regards to Celiac's disease:  Diagnosed by gastroenterology at  with small bowel biopsy and positive antibodies confirming diagnosis.   He has been doing his best to adhere to a gluten free diet.      Also with CARLOS and wears CPAP regularly.     Review of Systems   Constitutional: Negative for chills and fever.   Gastrointestinal: Negative for nausea and vomiting.   No CP  No SOB    Objective:   Physical Exam  Vitals signs and nursing note reviewed.       BP Readings from Last 3 Encounters:   07/16/20 122/80   10/18/19 (!) 143/86   07/24/19 123/75     Wt Readings from Last 1 Encounters:   10/18/19 1100 101.5 kg (223 lb 12.3 oz)       There is no height or weight on file to calculate BMI.    Lab Review:   Lab Results   Component Value Date    HGBA1C 6.0 (H) 07/10/2020     No results found for: CHOL, HDL, LDLCALC, TRIG, CHOLHDL  Lab Results   Component Value Date     07/10/2020    K 3.9 07/10/2020     07/10/2020    CO2 25 07/10/2020     (H) 07/10/2020    BUN 16 07/10/2020    CREATININE 1.2 07/10/2020    CALCIUM 9.4 07/10/2020    PROT 8.4 07/10/2020    ALBUMIN 4.2 07/10/2020    BILITOT 0.8 07/10/2020    ALKPHOS 74 07/10/2020    AST 36 07/10/2020    ALT 37 07/10/2020    ANIONGAP 11 07/10/2020    ESTGFRAFRICA >60.0 07/10/2020    EGFRNONAA >60.0 07/10/2020    TSH 1.712 07/10/2020         Assessment and Plan     Acquired hypothyroidism  --Patient with hypothyroidism secondary to XRT  --TPO negative  --Biochemically euthyroid  --Continue Synthroid 150 mcg PO once daily    Male hypogonadism  --Patient with male hypogonadism  --Initial evaluation was done at  and included a normal pituitary MRI  --Likely multifactorial due to XRT, chemotherapy,  weight gain and CARLOS  --His T levels are normal on Clomid  --We had a long discussion regarding continuing Clomid long term vs starting testosterone therapy in the near future given that his wife is now pregnant  --I advised him that we would need to check his early AM testosterone levels off of Clomid for at least one month before determining if he needs testosterone therapy going forward  --Also had discussion about benefits/risks of testosterone    Celiac disease  --Patient to continue gluten free diet    Obstructive sleep apnea  --He is compliant with CPAP    He is concerned about risk of CAD in patients that received mediastinal radiation for Hodgkins. I encouraged him to consider seeing cardiology.     RTC in 3-4 months with labs.     Rudolph Schafer M.D. Staff Endocrinology

## 2020-07-16 NOTE — ASSESSMENT & PLAN NOTE
--Patient with male hypogonadism  --Initial evaluation was done at  and included a normal pituitary MRI  --Likely multifactorial due to XRT, chemotherapy, weight gain and CARLOS  --His T levels are normal on Clomid  --We had a long discussion regarding continuing Clomid long term vs starting testosterone therapy in the near future given that his wife is now pregnant  --I advised him that we would need to check his early AM testosterone levels off of Clomid for at least one month before determining if he needs testosterone therapy going forward  --Also had discussion about benefits/risks of testosterone

## 2020-07-16 NOTE — ASSESSMENT & PLAN NOTE
--Patient with hypothyroidism secondary to XRT  --TPO negative  --Biochemically euthyroid  --Continue Synthroid 150 mcg PO once daily

## 2020-07-17 ENCOUNTER — PATIENT MESSAGE (OUTPATIENT)
Dept: ENDOCRINOLOGY | Facility: CLINIC | Age: 45
End: 2020-07-17

## 2020-07-20 ENCOUNTER — PATIENT MESSAGE (OUTPATIENT)
Dept: ENDOCRINOLOGY | Facility: CLINIC | Age: 45
End: 2020-07-20

## 2020-07-20 DIAGNOSIS — N52.9 ERECTILE DYSFUNCTION, UNSPECIFIED ERECTILE DYSFUNCTION TYPE: Primary | ICD-10-CM

## 2020-07-20 RX ORDER — SILDENAFIL 50 MG/1
50 TABLET, FILM COATED ORAL DAILY PRN
Qty: 30 TABLET | Refills: 3 | Status: SHIPPED | OUTPATIENT
Start: 2020-07-20 | End: 2021-11-01

## 2020-07-21 LAB
ALBUMIN SERPL-MCNC: 4.3 G/DL (ref 3.6–5.1)
SHBG SERPL-SCNC: 27 NMOL/L (ref 10–50)
TESTOST FREE SERPL-MCNC: 71.2 PG/ML (ref 46–224)
TESTOST SERPL-MCNC: 449 NG/DL (ref 250–1100)
TESTOSTERONE.FREE+WB SERPL-MCNC: 140.3 NG/DL (ref 110–575)

## 2020-08-24 ENCOUNTER — OFFICE VISIT (OUTPATIENT)
Dept: GASTROENTEROLOGY | Facility: CLINIC | Age: 45
End: 2020-08-24
Payer: COMMERCIAL

## 2020-08-24 VITALS
BODY MASS INDEX: 33.2 KG/M2 | WEIGHT: 224.19 LBS | DIASTOLIC BLOOD PRESSURE: 86 MMHG | HEIGHT: 69 IN | HEART RATE: 77 BPM | SYSTOLIC BLOOD PRESSURE: 139 MMHG

## 2020-08-24 DIAGNOSIS — R10.30 LOWER ABDOMINAL PAIN: ICD-10-CM

## 2020-08-24 DIAGNOSIS — K50.00 TERMINAL ILEITIS WITHOUT COMPLICATION: ICD-10-CM

## 2020-08-24 DIAGNOSIS — R14.0 ABDOMINAL DISTENSION: ICD-10-CM

## 2020-08-24 DIAGNOSIS — K90.0 CELIAC DISEASE: ICD-10-CM

## 2020-08-24 DIAGNOSIS — R14.0 BLOATING: Primary | ICD-10-CM

## 2020-08-24 DIAGNOSIS — K21.9 GASTROESOPHAGEAL REFLUX DISEASE, ESOPHAGITIS PRESENCE NOT SPECIFIED: ICD-10-CM

## 2020-08-24 PROCEDURE — 99999 PR PBB SHADOW E&M-EST. PATIENT-LVL III: CPT | Mod: PBBFAC,,, | Performed by: INTERNAL MEDICINE

## 2020-08-24 PROCEDURE — 3008F PR BODY MASS INDEX (BMI) DOCUMENTED: ICD-10-PCS | Mod: CPTII,S$GLB,, | Performed by: INTERNAL MEDICINE

## 2020-08-24 PROCEDURE — 99999 PR PBB SHADOW E&M-EST. PATIENT-LVL III: ICD-10-PCS | Mod: PBBFAC,,, | Performed by: INTERNAL MEDICINE

## 2020-08-24 PROCEDURE — 99214 OFFICE O/P EST MOD 30 MIN: CPT | Mod: S$GLB,,, | Performed by: INTERNAL MEDICINE

## 2020-08-24 PROCEDURE — 3008F BODY MASS INDEX DOCD: CPT | Mod: CPTII,S$GLB,, | Performed by: INTERNAL MEDICINE

## 2020-08-24 PROCEDURE — 99214 PR OFFICE/OUTPT VISIT, EST, LEVL IV, 30-39 MIN: ICD-10-PCS | Mod: S$GLB,,, | Performed by: INTERNAL MEDICINE

## 2020-08-24 NOTE — PROGRESS NOTES
[facility] Established Patient Visit    Reason for Visit:    Chief Complaint   Patient presents with    Follow-up    EGD       PCP: Aashish Ceja    HPI:  Ivan Rodriguez is a 45 y.o. male here for follow-up of lower abdominal pain and bloating and prior history of suspected celiac disease.  I last saw him October of last year.  At that time, his celiac serology has been negative and he had not been on a gluten free diet.  I questioned the diagnosis of celiac disease in recommended an EGD for further evaluation, but this had not been done.  He had been doing okay with reflux that time on AcipHex.  He stop taking AcipHex of few months ago as he was feeling better.  He has generally done well in regards to reflux except that he continues to have symptoms that are diet related.  He has classic symptoms of heartburn and sour taste in the back of his throat.  He will use AcipHex for 1-2 weeks with resolution of symptoms after which he is able to stop until symptoms return.  He denies dysphagia.  He is continued report bloating and lower abdominal distention, worse after eating.  The pain is very low in the abdomen, suprapubic area the feels like a sharp knife at times.  He notices more symptoms when he consumes gluten rich meals.  He denies diarrhea.  He is not taking NSAIDs.  He continues to be concerned about his prior hernia repair, possibility of recurrent hernia, and mesh placed during previous surgery.          ROS:  Constitutional: No fevers, chills, No weight loss, normal appetite  GI: see HPI          PMHX:  has a past medical history of Hypothyroidism and Thyroid disease.    PSHX:  has a past surgical history that includes Hernia repair; Lymph node biopsy (Right, 2003); and Hernia mesh removal (09/2019).    The patient's social and family histories were reviewed by me and updated in the appropriate section of the electronic medical record.    Review of patient's allergies  "indicates:  No Known Allergies    Prior to Admission medications    Medication Sig Start Date End Date Taking? Authorizing Provider   clomiPHENE (CLOMID) 50 mg tablet Take 1 tablet (50 mg total) by mouth every other day.  Patient not taking: Reported on 8/24/2020 7/16/20   Rudolph Schafer MD   RABEprazole (ACIPHEX) 20 mg tablet Take 1 tablet (20 mg total) by mouth once daily.  Patient not taking: Reported on 7/16/2020 10/18/19 10/17/20  Mohan Rodriguez MD   sildenafiL (VIAGRA) 50 MG tablet Take 1 tablet (50 mg total) by mouth daily as needed for Erectile Dysfunction (Take 1 hr prior to sexual activity).  Patient not taking: Reported on 8/24/2020 7/20/20 7/20/21  Rudolph Schafer MD   SYNTHROID 150 mcg tablet Take 1 tablet (150 mcg total) by mouth once daily. Brand name medically necessary  Patient not taking: Reported on 8/24/2020 7/16/20 7/16/21  Rudolph Schafer MD         Objective Findings:  Vital Signs:  /86   Pulse 77   Ht 5' 9" (1.753 m)   Wt 101.7 kg (224 lb 3.3 oz)   BMI 33.11 kg/m²  Body mass index is 33.11 kg/m².    Physical Exam:  General Appearance:  Well appearing in no acute distress, appears stated age  Head:  Normocephalic, atraumatic  Eyes:  No scleral icterus or pallor, EOMI  Abdomen:  He has 2 small 1 cm healed incisions in the left lower quadrant and right lower quadrant from prior laparoscopy, he also has a 1 cm healed incisions above the umbilicus and 1 below.  There is a small umbilical hernia present that is reducible, but tender to palpation.      Labs:  Lab Results   Component Value Date    WBC 8.18 07/10/2020    HGB 14.5 07/10/2020    HCT 44.6 07/10/2020    MCV 88 07/10/2020    RDW 13.3 07/10/2020     07/10/2020    GRAN 4.8 07/10/2020    GRAN 59.2 07/10/2020    LYMPH 2.5 07/10/2020    LYMPH 30.1 07/10/2020    MONO 0.7 07/10/2020    MONO 8.9 07/10/2020    EOS 0.1 07/10/2020    BASO 0.03 07/10/2020     Lab Results   Component Value Date     07/10/2020    K 3.9 " 07/10/2020     07/10/2020    CO2 25 07/10/2020     (H) 07/10/2020    BUN 16 07/10/2020    CREATININE 1.2 07/10/2020    CALCIUM 9.4 07/10/2020    PROT 8.4 07/10/2020    ALBUMIN 4.3 07/10/2020    ALBUMIN 4.2 07/10/2020    BILITOT 0.8 07/10/2020    ALKPHOS 74 07/10/2020    AST 36 07/10/2020    ALT 37 07/10/2020                   Assessment:  Ivan Rodriguez is a 45 y.o. male here with:  1. Bloating    2. Abdominal distension    3. Gastroesophageal reflux disease, esophagitis presence not specified    4. Terminal ileitis without complication    5. Celiac disease    6. Lower abdominal pain      Ongoing bloating, lower abdominal pain, and lower abdominal distention associated with oral intake.  There is an umbilical hernia, but symptoms are below that area and slightly to the left of midline.  I do not believe the umbilical hernia is causing him significant symptoms.  Difficult to say if there is any bowel involvement without further imaging.  Symptoms could be due to his prior hernia repair with mesh placement, either adhesions or nerve related.  The could be also weaknesses in the abdominal wall related to prior surgery resulting in these episodes of transient abdominal distention.  There continues to be no lingering and ongoing question of whether he has celiac disease or not.  He believes his symptoms are worse with gluten intake.  He could have non celiac gluten intolerance.  He has history of prior duodenal inflammation and lymphocytes in the terminal ileum of unclear significance.  I think the possibility of Crohn's disease is low.    He is having reflux symptoms that are classic and associated with intake of certain foods.  The symptoms resolved with AcipHex use.  He is able to use AcipHex as needed.  No alarm symptoms.        Recommendations:  1.  I will arrange for EGD and colonoscopy to assess further.  Will get extensive biopsies in the proximal small bowel and get sampling from the terminal  ileum.  2.  If endoscopy is unrevealing, would consider CT scan of the abdomen and pelvis with IV and oral contrast to evaluate for bowel inflammation, hernias, and any signs of partial bowel obstruction.  3.  Could consider checking fecal calprotectin if above studies are unremarkable, but will wait on this until above tests are done.  4.  Okay to continue AcipHex as is pending endoscopy      Follow-up pending above          Mohan Rodriguez MD

## 2020-08-25 DIAGNOSIS — Z12.11 SPECIAL SCREENING FOR MALIGNANT NEOPLASMS, COLON: Primary | ICD-10-CM

## 2020-08-25 DIAGNOSIS — Z01.818 PRE-OP TESTING: ICD-10-CM

## 2020-08-25 RX ORDER — POLYETHYLENE GLYCOL 3350, SODIUM SULFATE ANHYDROUS, SODIUM BICARBONATE, SODIUM CHLORIDE, POTASSIUM CHLORIDE 236; 22.74; 6.74; 5.86; 2.97 G/4L; G/4L; G/4L; G/4L; G/4L
4 POWDER, FOR SOLUTION ORAL ONCE
Qty: 4000 ML | Refills: 0 | Status: SHIPPED | OUTPATIENT
Start: 2020-08-25 | End: 2020-08-25

## 2020-09-29 ENCOUNTER — OFFICE VISIT (OUTPATIENT)
Dept: INTERNAL MEDICINE | Facility: CLINIC | Age: 45
End: 2020-09-29
Payer: COMMERCIAL

## 2020-09-29 ENCOUNTER — LAB VISIT (OUTPATIENT)
Dept: LAB | Facility: HOSPITAL | Age: 45
End: 2020-09-29
Attending: INTERNAL MEDICINE
Payer: COMMERCIAL

## 2020-09-29 VITALS
WEIGHT: 212.31 LBS | DIASTOLIC BLOOD PRESSURE: 62 MMHG | HEART RATE: 89 BPM | HEIGHT: 69 IN | BODY MASS INDEX: 31.44 KG/M2 | SYSTOLIC BLOOD PRESSURE: 112 MMHG

## 2020-09-29 DIAGNOSIS — R73.03 PREDIABETES: ICD-10-CM

## 2020-09-29 DIAGNOSIS — Z12.5 PROSTATE CANCER SCREENING: ICD-10-CM

## 2020-09-29 DIAGNOSIS — R10.9 CHRONIC ABDOMINAL PAIN: ICD-10-CM

## 2020-09-29 DIAGNOSIS — G89.29 CHRONIC ABDOMINAL PAIN: ICD-10-CM

## 2020-09-29 DIAGNOSIS — Z00.00 ANNUAL PHYSICAL EXAM: Primary | ICD-10-CM

## 2020-09-29 DIAGNOSIS — R41.840 CONCENTRATION DEFICIT: ICD-10-CM

## 2020-09-29 DIAGNOSIS — Z00.00 ANNUAL PHYSICAL EXAM: ICD-10-CM

## 2020-09-29 DIAGNOSIS — E03.9 ACQUIRED HYPOTHYROIDISM: ICD-10-CM

## 2020-09-29 DIAGNOSIS — Z85.72 HISTORY OF LYMPHOMA: ICD-10-CM

## 2020-09-29 DIAGNOSIS — G47.33 OSA ON CPAP: ICD-10-CM

## 2020-09-29 PROCEDURE — 84153 ASSAY OF PSA TOTAL: CPT

## 2020-09-29 PROCEDURE — 99386 PR PREVENTIVE VISIT,NEW,40-64: ICD-10-PCS | Mod: S$GLB,,, | Performed by: INTERNAL MEDICINE

## 2020-09-29 PROCEDURE — 99999 PR PBB SHADOW E&M-EST. PATIENT-LVL IV: CPT | Mod: PBBFAC,,, | Performed by: INTERNAL MEDICINE

## 2020-09-29 PROCEDURE — 99999 PR PBB SHADOW E&M-EST. PATIENT-LVL IV: ICD-10-PCS | Mod: PBBFAC,,, | Performed by: INTERNAL MEDICINE

## 2020-09-29 PROCEDURE — 3008F BODY MASS INDEX DOCD: CPT | Mod: CPTII,S$GLB,, | Performed by: INTERNAL MEDICINE

## 2020-09-29 PROCEDURE — 3008F PR BODY MASS INDEX (BMI) DOCUMENTED: ICD-10-PCS | Mod: CPTII,S$GLB,, | Performed by: INTERNAL MEDICINE

## 2020-09-29 PROCEDURE — 36415 COLL VENOUS BLD VENIPUNCTURE: CPT

## 2020-09-29 PROCEDURE — 99386 PREV VISIT NEW AGE 40-64: CPT | Mod: S$GLB,,, | Performed by: INTERNAL MEDICINE

## 2020-09-29 PROCEDURE — 80061 LIPID PANEL: CPT

## 2020-09-29 RX ORDER — RABEPRAZOLE SODIUM 20 MG/1
20 TABLET, DELAYED RELEASE ORAL DAILY PRN
Qty: 90 TABLET | Refills: 3
Start: 2020-09-29 | End: 2021-09-29

## 2020-09-29 NOTE — PROGRESS NOTES
INTERNAL MEDICINE INITIAL VISIT NOTE      CHIEF COMPLAINT     EST CARE    HPI     Ivan Rodriguez is a 45 y.o. C male who presents to est care.  Previous PCP was at .   Declines flu vaccine.     NSHL s/p chemoXRT 1996 at .   XRT to the chest area. Does crossfit for 2 hours a day. No issues w/ CP. No CLARKE/SOB. No wheezing.   Last saw Dr. Martinez 2/15/19. F/u in 1 yr.     Hypothyroidism (likely due to XRT) - synthroid 150mcg daily (brand name only)  TFT WNL 7/10/20.  Hypogonadism - clomid 50mg every other day    PDM 7/10/20 - a1c 6.    CARLOS - CPAP nightly. Helps w/ tiredness and snoring.     In 2013, hurt his back. During work up, found to have small R inguinal hernia. Had an elective B inguinal hernia surgery. Within a month or so, back pain was completely gone. Since then has been having lower abdominal pain/pelvic pain.   Saw Dr. Andrade 3/2016 - recommended pain management.   Last yr saw surgeon at [facility] - reports had part of the mesh taken out. However didn't help w/ pain.   Celiac disease dx by small bowel biopsy and positive TTG Abs at   Has been seeing Dr. Rodriguez - LOV 8/24/20. Repeat TTG Abs negative 7/2019.  Plan for EGD and Cscope.   Has constant abdominal pain but may vary from 3 to 6 of 10.   Feels this may be due to scar tissue (R abdomen is more prominent than the L).   Has been actively trying to lose weight - crossfit and swim. Losing weight seems to be helping.   Started acupuncture, which helped w/ certain areas.     GERD - on aciphex 20mg daily prn.     Pt feels that he does not have a lot of energy. Finds himself more forgetful sometimes. He is in the [employer] business. Reports cousin who is also in investment talked to his internist on the [facility] and started on adderall, which helped him. Was wondering if this would help him also. Discussed will need to see psychiatry and would have to be eval for ADHD to see if adderall is appropriate. We do not prescribe it for energy.     Past  "Medical History:  Past Medical History:   Diagnosis Date    Hypothyroidism     Thyroid disease        Past Surgical History:  Past Surgical History:   Procedure Laterality Date    HERNIA MESH REMOVAL  09/2019    HERNIA REPAIR      LYMPH NODE BIOPSY Right 2003    [place]       Allergies:  Review of patient's allergies indicates:  No Known Allergies    Home Medications:  Prior to Admission medications    Medication Sig Start Date End Date Taking? Authorizing Provider   clomiPHENE (CLOMID) 50 mg tablet Take 1 tablet (50 mg total) by mouth every other day.  Patient not taking: Reported on 8/24/2020 7/16/20   Rudolph Schafer MD   RABEprazole (ACIPHEX) 20 mg tablet Take 1 tablet (20 mg total) by mouth once daily.  Patient not taking: Reported on 7/16/2020 10/18/19 10/17/20  Mohan Rodriguez MD   sildenafiL (VIAGRA) 50 MG tablet Take 1 tablet (50 mg total) by mouth daily as needed for Erectile Dysfunction (Take 1 hr prior to sexual activity).  Patient not taking: Reported on 8/24/2020 7/20/20 7/20/21  Rudolph Schafer MD   SYNTHROID 150 mcg tablet Take 1 tablet (150 mcg total) by mouth once daily. Brand name medically necessary  Patient not taking: Reported on 8/24/2020 7/16/20 7/16/21  Rudolph Schafer MD       Family History:  Family History   Problem Relation Age of Onset    Kidney disease Father     Colon cancer Neg Hx     Esophageal cancer Neg Hx        Social History:  Social History     Tobacco Use    Smoking status: Never Smoker    Smokeless tobacco: Never Used   Substance Use Topics    Alcohol use: No     Alcohol/week: 0.0 standard drinks    Drug use: Never       Review of Systems:  Review of Systems Comprehensive review of systems otherwise negative. See history/subjective section for more details.    Health Maintainence:    reviewed.     PHYSICAL EXAM     /62 (BP Location: Left arm, Patient Position: Sitting, BP Method: Large (Manual))   Pulse 89   Ht 5' 9" (1.753 m)   Wt 96.3 kg (212 lb 4.9 " oz)   BMI 31.35 kg/m²     GEN - A+OX4, NAD   HEENT - PERRL, EOMI, OP clear. MMM. TM normal.   Neck - No thyromegaly or cervical LAD. No thyroid masses felt.  CV - RRR, no m/r   Chest - CTAB, no wheezing or rhonchi  Abd - S/ND/+BS. Area below the umbilicus is painful on palpation. No masses. Small umbilical hernia.   Ext - 2+BDP and radial pulses. No LE edema.   Neuro - PERRL, EOMI, no nystagmus, eyebrow raise, facial sensation, hearing, m of mastication, smile, palatal raise, shoulder shrug, tongue protrusion symmetric and intact. 5/5 BUE and BLE strength. Sensation to light touch intact throughout. 2+ DTRs. Normal gait.   MSK - No spinal tenderness to palpation. Normal gait.   Skin - No rash.    LABS     Previous labs reviewed.    ASSESSMENT/PLAN     Ivan Rodriguez is a 45 y.o. male with  Ivan was seen today for establish care.    Diagnoses and all orders for this visit:    Annual physical exam  -     Lipid Panel; Future; Expected date: 09/29/2020    Chronic abdominal pain - EGD/Cscope planned for next week.   -     CT Abdomen Pelvis With Contrast; Future; Expected date: 09/29/2020    History of lymphoma  -     CT Abdomen Pelvis With Contrast; Future; Expected date: 09/29/2020    Prediabetes - work on diet and exercise.     CARLOS on CPAP - doing well.     Acquired hypothyroidism - cont synthroid.     Prostate cancer screening  -     PSA, Screening; Future; Expected date: 09/29/2020    Concentration deficit  -     Ambulatory referral/consult to Psychiatry; Future; Expected date: 10/06/2020    Other orders  -     RABEprazole (ACIPHEX) 20 mg tablet; Take 1 tablet (20 mg total) by mouth daily as needed.    Declines flu vaccine. Will get Tdap if he wants to.     RTC in 12 months, sooner if needed and depending on labs.    Jenifer Laird MD  Department of Internal Medicine - [facility]  12:37 PM

## 2020-09-30 ENCOUNTER — TELEPHONE (OUTPATIENT)
Dept: INTERNAL MEDICINE | Facility: CLINIC | Age: 45
End: 2020-09-30

## 2020-09-30 DIAGNOSIS — E78.1 HYPERTRIGLYCERIDEMIA: Primary | ICD-10-CM

## 2020-09-30 LAB
CHOLEST SERPL-MCNC: 130 MG/DL (ref 120–199)
CHOLEST/HDLC SERPL: 4.3 {RATIO} (ref 2–5)
COMPLEXED PSA SERPL-MCNC: 0.67 NG/ML (ref 0–4)
HDLC SERPL-MCNC: 30 MG/DL (ref 40–75)
HDLC SERPL: 23.1 % (ref 20–50)
LDLC SERPL CALC-MCNC: ABNORMAL MG/DL (ref 63–159)
NONHDLC SERPL-MCNC: 100 MG/DL
TRIGL SERPL-MCNC: 402 MG/DL (ref 30–150)

## 2020-09-30 NOTE — TELEPHONE ENCOUNTER
Spoke to patient and inform him of his Prostate specific antigen result, cholesterol and triglycerides results. Recommendation for a low fat diet and trying a Mediterranean diet is relayed to the patient, as well as a repeat cholesterol fasting in a month, and possibly recommending to take something to lower cholesterol if it similar. Patient with good return verbal demonstration and understanding. Offered to schedule a repeat lipid panel in a month, but patient declined. He stated he will call to schedule the labs himself.

## 2020-09-30 NOTE — TELEPHONE ENCOUNTER
Please call and notify pt:    His prostate specific antigen is normal. His cholesterol is high. His triglycerides especially is very high. I recommend a low fat diet at this time - try Mediterranean diet.  I'd like to repeat his cholesterol fasting in a month. If it is still similar, I would recommend taking something to lower cholesterol.

## 2020-10-12 ENCOUNTER — TELEPHONE (OUTPATIENT)
Dept: ENDOSCOPY | Facility: HOSPITAL | Age: 45
End: 2020-10-12

## 2020-10-12 NOTE — TELEPHONE ENCOUNTER
Spoke with patient. He wishes to reschedule later and will call back to reschedule. He does not want his orders cancelled.

## 2020-10-12 NOTE — TELEPHONE ENCOUNTER
----- Message from Mohan Rodriguez MD sent at 10/12/2020  2:04 PM CDT -----  Regarding: RE: cancellation  Contact: pt  Thank you for letting me know.  This information should have also been sent to our scheduling nurses to be addressed.  I am copying them to this message.    Dr. Rodriguez    ----- Message -----  From: Astrid Jose  Sent: 10/9/2020   1:43 PM CDT  To: Mohan Rodriguez MD  Subject: cancellation                                     Pt is requesting to cancel his EGD (ESOPHAGOGASTRODUODENOSCOPY) [[id number]]  COLONOSCOPY     Pt can be reached at [phone]

## 2020-11-14 ENCOUNTER — OFFICE VISIT (OUTPATIENT)
Dept: URGENT CARE | Facility: CLINIC | Age: 45
End: 2020-11-14
Payer: COMMERCIAL

## 2020-11-14 VITALS
HEIGHT: 69 IN | BODY MASS INDEX: 31.4 KG/M2 | OXYGEN SATURATION: 95 % | DIASTOLIC BLOOD PRESSURE: 93 MMHG | SYSTOLIC BLOOD PRESSURE: 148 MMHG | WEIGHT: 212 LBS | TEMPERATURE: 99 F | HEART RATE: 71 BPM

## 2020-11-14 DIAGNOSIS — J18.9 COMMUNITY ACQUIRED PNEUMONIA OF RIGHT LUNG, UNSPECIFIED PART OF LUNG: ICD-10-CM

## 2020-11-14 DIAGNOSIS — R05.9 COUGH: Primary | ICD-10-CM

## 2020-11-14 LAB
CTP QC/QA: YES
SARS-COV-2 RDRP RESP QL NAA+PROBE: NEGATIVE

## 2020-11-14 PROCEDURE — 71046 XR CHEST PA AND LATERAL: ICD-10-PCS | Mod: FY,S$GLB,, | Performed by: RADIOLOGY

## 2020-11-14 PROCEDURE — U0002: ICD-10-PCS | Mod: QW,S$GLB,, | Performed by: NURSE PRACTITIONER

## 2020-11-14 PROCEDURE — 99214 PR OFFICE/OUTPT VISIT, EST, LEVL IV, 30-39 MIN: ICD-10-PCS | Mod: S$GLB,,, | Performed by: NURSE PRACTITIONER

## 2020-11-14 PROCEDURE — 3008F BODY MASS INDEX DOCD: CPT | Mod: CPTII,S$GLB,, | Performed by: NURSE PRACTITIONER

## 2020-11-14 PROCEDURE — 3008F PR BODY MASS INDEX (BMI) DOCUMENTED: ICD-10-PCS | Mod: CPTII,S$GLB,, | Performed by: NURSE PRACTITIONER

## 2020-11-14 PROCEDURE — 71046 X-RAY EXAM CHEST 2 VIEWS: CPT | Mod: FY,S$GLB,, | Performed by: RADIOLOGY

## 2020-11-14 PROCEDURE — 99214 OFFICE O/P EST MOD 30 MIN: CPT | Mod: S$GLB,,, | Performed by: NURSE PRACTITIONER

## 2020-11-14 PROCEDURE — U0002 COVID-19 LAB TEST NON-CDC: HCPCS | Mod: QW,S$GLB,, | Performed by: NURSE PRACTITIONER

## 2020-11-14 RX ORDER — LEVOFLOXACIN 500 MG/1
500 TABLET, FILM COATED ORAL DAILY
Qty: 7 TABLET | Refills: 0 | Status: SHIPPED | OUTPATIENT
Start: 2020-11-14 | End: 2020-11-21

## 2020-11-14 RX ORDER — ALBUTEROL SULFATE 90 UG/1
2 AEROSOL, METERED RESPIRATORY (INHALATION) EVERY 6 HOURS PRN
Qty: 18 G | Refills: 0 | Status: SHIPPED | OUTPATIENT
Start: 2020-11-14 | End: 2021-10-20

## 2020-11-14 NOTE — PROGRESS NOTES
"Subjective:       Patient ID: Ivan Rodriguez is a 45 y.o. male.    Vitals:  height is 5' 9" (1.753 m) and weight is 96.2 kg (212 lb). His temperature is 98.5 °F (36.9 °C). His blood pressure is 148/93 (abnormal) and his pulse is 71. His oxygen saturation is 95%.     Chief Complaint: COVID-19 Concerns    Patient is an otherwise well-appearing adult male in no apparent distress.    Comes to the clinic today with complaint of cough.  States he has had similar cough in the past with episodes of pneumonia and is concern for same as well as COVID-19.  Request swab for COVID-19 today.  No fever or chills.  No nausea vomiting or diarrhea.  No sudden like a sense of taste or smell.  No known sick contacts.    Patient with significant history of Hodgkin's lymphoma and thyroid disease with radiation therapy to anterior chest in distant past.    URI   This is a new problem. The current episode started in the past 7 days. The problem has been unchanged. There has been no fever. Associated symptoms include congestion, coughing and headaches. Pertinent negatives include no ear pain, nausea, rash, sinus pain, sore throat, vomiting or wheezing. He has tried nothing for the symptoms.       Constitution: Negative for chills, sweating, fatigue and fever.   HENT: Positive for congestion. Negative for ear pain, sinus pain, sinus pressure, sore throat and voice change.    Neck: Negative for painful lymph nodes.   Eyes: Negative for eye redness.   Respiratory: Positive for cough. Negative for chest tightness, sputum production, bloody sputum, COPD, shortness of breath, stridor, wheezing and asthma.    Gastrointestinal: Negative for nausea and vomiting.   Musculoskeletal: Negative for muscle ache.   Skin: Negative for rash.   Allergic/Immunologic: Negative for seasonal allergies and asthma.   Neurological: Positive for headaches.   Hematologic/Lymphatic: Negative for swollen lymph nodes.       Objective:      Physical Exam "   Constitutional: He is oriented to person, place, and time. He appears well-developed. He is cooperative.  Non-toxic appearance. He does not appear ill. No distress.   HENT:   Head: Normocephalic and atraumatic.   Ears:   Right Ear: Hearing, external ear and ear canal normal. A middle ear effusion is present.   Left Ear: Hearing, external ear and ear canal normal. A middle ear effusion is present.   Nose: Rhinorrhea present. No mucosal edema or nasal deformity. No epistaxis.   Mouth/Throat: Uvula is midline, oropharynx is clear and moist and mucous membranes are normal. No trismus in the jaw. Normal dentition. No uvula swelling. Cobblestoning present. No oropharyngeal exudate, posterior oropharyngeal edema or posterior oropharyngeal erythema.   Bilateral clear fluid effusion      Comments: Bilateral clear fluid effusion  Eyes: Conjunctivae and lids are normal. No scleral icterus.   Neck: Trachea normal, full passive range of motion without pain and phonation normal. Neck supple. No neck rigidity. No edema and no erythema present.      Comments: Voice is hoarse   Cardiovascular: Normal rate, regular rhythm, normal heart sounds and normal pulses.   Pulmonary/Chest: Effort normal and breath sounds normal. No stridor. No respiratory distress. He has no decreased breath sounds. He has no wheezes. He has no rhonchi. He has no rales.   Abdominal: Normal appearance.   Musculoskeletal: Normal range of motion.         General: No deformity.   Neurological: He is alert and oriented to person, place, and time. He exhibits normal muscle tone. Coordination normal.   Skin: Skin is warm, dry, intact, not diaphoretic and not pale. Psychiatric: His speech is normal and behavior is normal. Judgment and thought content normal.   Nursing note and vitals reviewed.    X-ray Chest Pa And Lateral    Result Date: 11/14/2020  EXAMINATION: XR CHEST PA AND LATERAL CLINICAL HISTORY: Cough TECHNIQUE: PA and lateral views of the chest were  performed. COMPARISON: None FINDINGS: The trachea is unremarkable.  The cardiomediastinal silhouette is within normal limits.  There is no evidence of free air beneath the hemidiaphragms.  There are no pleural effusions.  There is no evidence of a pneumothorax.  There is no evidence of pneumomediastinum.  There is a right infrahilar airspace opacity.  The osseous structures are unremarkable.     Right infrahilar airspace opacity, suggestive of pneumonia. Electronically signed by: Royn Martinez MD Date:    11/14/2020 Time:    17:44        Assessment:       1. Cough    2. Community acquired pneumonia of right lung, unspecified part of lung        Plan:         Cough  -     POCT COVID-19 Rapid Screening  -     X-Ray Chest PA And Lateral; Future; Expected date: 11/14/2020  -     albuterol (VENTOLIN HFA) 90 mcg/actuation inhaler; Inhale 2 puffs into the lungs every 6 (six) hours as needed for Wheezing. Rescue  Dispense: 18 g; Refill: 0    Community acquired pneumonia of right lung, unspecified part of lung  -     levoFLOXacin (LEVAQUIN) 500 MG tablet; Take 1 tablet (500 mg total) by mouth once daily. for 7 days  Dispense: 7 tablet; Refill: 0      Patient Instructions     Walking Pneumonia (Mycoplasma Pneumonia)  What is walking pneumonia?    Pneumonia is an infection of one or both of the lungs. It's usually caused by a virus, or bacteria. Walking pneumonia is caused by a specific bacteria called mycoplasma. Pneumonia can be very serious, especially in infants, young children, and older adults. And also in those with other long-term health problems or weak immune systems. In otherwise healthy adults, pneumonia can be mild. Mycoplasma pneumonia is a mild form and is often called walking pneumonia.  What are the symptoms of walking pneumonia?  The most common symptom is a dry, hacking cough. You may also feel tired.  Other symptoms may include:  · Fever  · Headaches  · Chills  · Sweating  · Chest pain  · Ear pain  · Sore  throat  How is walking pneumonia diagnosed?  Your healthcare provider will ask about your medical history and current symptoms. He or she will also examine you. You may also have tests including:  · Imaging. A chest X-ray of your chest may be done.  · Lab tests. Blood tests and sputum cultures may be done.  How is walking pneumonia treated?  Since it's caused by bacteria, antibiotics are usually prescribed. However, it may also go away without any treatment.  Your healthcare provider may also recommend medicines, either prescription or over-the-counter, and other measures to relieve symptoms such as:  · Acetaminophen or ibuprofen to lower your fever and lessen headache or other pain  · Cough medicine to loosen mucus or reduce coughing  · Bedrest or reduced activity  · Increased fluids to loosen mucus and replace lost fluids from sweating  Make sure you check with your healthcare provider or pharmacist before taking any over-the-counter medicines.  What are the complications of walking pneumonia?  Although walking pneumonia is usually mild, and it often goes away without treatment, complications can occur. They include:  · Severe pneumonia  · Serious infections in other parts of the body  · Anemia or a low red blood cell count  · Kidney problems  · Skin conditions  What can I do to prevent walking pneumonia?  To prevent others from getting walking pneumonia:  · Try to stay away from other people if you are coughing a lot.  · Cover your mouth when you cough. It's easy to pass along this infection to other people through coughing, and contact with surfaces that you cough near.  · Wipe off surfaces, including phones, remote controls, and doorknobs with antibacterial or disinfectant products.  · Tell people to wash their hands if they touch things you have coughed near.  · Make sure to wash your hands often. Wash them before handling food or objects that others may touch. Use a separate towel or paper towels for  drying.  Date Last Reviewed: 1/1/2017  © 9222-3053 [employer]. [address] [place] [address], [place] [address]. All rights reserved. This information is not intended as a substitute for professional medical care. Always follow your healthcare professional's instructions.

## 2020-11-15 NOTE — PATIENT INSTRUCTIONS
Walking Pneumonia (Mycoplasma Pneumonia)  What is walking pneumonia?    Pneumonia is an infection of one or both of the lungs. It's usually caused by a virus, or bacteria. Walking pneumonia is caused by a specific bacteria called mycoplasma. Pneumonia can be very serious, especially in infants, young children, and older adults. And also in those with other long-term health problems or weak immune systems. In otherwise healthy adults, pneumonia can be mild. Mycoplasma pneumonia is a mild form and is often called walking pneumonia.  What are the symptoms of walking pneumonia?  The most common symptom is a dry, hacking cough. You may also feel tired.  Other symptoms may include:  · Fever  · Headaches  · Chills  · Sweating  · Chest pain  · Ear pain  · Sore throat  How is walking pneumonia diagnosed?  Your healthcare provider will ask about your medical history and current symptoms. He or she will also examine you. You may also have tests including:  · Imaging. A chest X-ray of your chest may be done.  · Lab tests. Blood tests and sputum cultures may be done.  How is walking pneumonia treated?  Since it's caused by bacteria, antibiotics are usually prescribed. However, it may also go away without any treatment.  Your healthcare provider may also recommend medicines, either prescription or over-the-counter, and other measures to relieve symptoms such as:  · Acetaminophen or ibuprofen to lower your fever and lessen headache or other pain  · Cough medicine to loosen mucus or reduce coughing  · Bedrest or reduced activity  · Increased fluids to loosen mucus and replace lost fluids from sweating  Make sure you check with your healthcare provider or pharmacist before taking any over-the-counter medicines.  What are the complications of walking pneumonia?  Although walking pneumonia is usually mild, and it often goes away without treatment, complications can occur. They include:  · Severe pneumonia  · Serious infections in  other parts of the body  · Anemia or a low red blood cell count  · Kidney problems  · Skin conditions  What can I do to prevent walking pneumonia?  To prevent others from getting walking pneumonia:  · Try to stay away from other people if you are coughing a lot.  · Cover your mouth when you cough. It's easy to pass along this infection to other people through coughing, and contact with surfaces that you cough near.  · Wipe off surfaces, including phones, remote controls, and doorknobs with antibacterial or disinfectant products.  · Tell people to wash their hands if they touch things you have coughed near.  · Make sure to wash your hands often. Wash them before handling food or objects that others may touch. Use a separate towel or paper towels for drying.  Date Last Reviewed: 1/1/2017  © 2200-5584 [employer]. [address] [place] [address], [place] [address]. All rights reserved. This information is not intended as a substitute for professional medical care. Always follow your healthcare professional's instructions.

## 2020-12-09 ENCOUNTER — LAB VISIT (OUTPATIENT)
Dept: LAB | Facility: HOSPITAL | Age: 45
End: 2020-12-09
Attending: INTERNAL MEDICINE
Payer: COMMERCIAL

## 2020-12-09 ENCOUNTER — TELEPHONE (OUTPATIENT)
Dept: INTERNAL MEDICINE | Facility: CLINIC | Age: 45
End: 2020-12-09

## 2020-12-09 DIAGNOSIS — Z20.822 EXPOSURE TO COVID-19 VIRUS: ICD-10-CM

## 2020-12-09 DIAGNOSIS — Z20.822 EXPOSURE TO COVID-19 VIRUS: Primary | ICD-10-CM

## 2020-12-09 LAB — SARS-COV-2 IGG SERPLBLD QL IA.RAPID: NEGATIVE

## 2020-12-09 PROCEDURE — 36415 COLL VENOUS BLD VENIPUNCTURE: CPT

## 2020-12-09 PROCEDURE — 86769 SARS-COV-2 COVID-19 ANTIBODY: CPT

## 2020-12-09 NOTE — TELEPHONE ENCOUNTER
----- Message from Libertad Colon sent at 12/9/2020 11:46 AM CST -----  Regarding:  Antibody Test  Pt would like to have the antibody test this afternoon. Can you please order?  Thank you

## 2020-12-10 ENCOUNTER — OFFICE VISIT (OUTPATIENT)
Dept: URGENT CARE | Facility: CLINIC | Age: 45
End: 2020-12-10
Payer: COMMERCIAL

## 2020-12-10 VITALS
OXYGEN SATURATION: 96 % | BODY MASS INDEX: 30.36 KG/M2 | RESPIRATION RATE: 14 BRPM | TEMPERATURE: 98 F | HEIGHT: 69 IN | SYSTOLIC BLOOD PRESSURE: 127 MMHG | HEART RATE: 91 BPM | WEIGHT: 205 LBS | DIASTOLIC BLOOD PRESSURE: 86 MMHG

## 2020-12-10 DIAGNOSIS — Z09 FOLLOW-UP EXAM: ICD-10-CM

## 2020-12-10 DIAGNOSIS — R05.9 COUGH: Primary | ICD-10-CM

## 2020-12-10 LAB
CTP QC/QA: YES
SARS-COV-2 RDRP RESP QL NAA+PROBE: NEGATIVE

## 2020-12-10 PROCEDURE — U0002: ICD-10-PCS | Mod: QW,S$GLB,, | Performed by: NURSE PRACTITIONER

## 2020-12-10 PROCEDURE — 71046 X-RAY EXAM CHEST 2 VIEWS: CPT | Mod: FY,S$GLB,, | Performed by: RADIOLOGY

## 2020-12-10 PROCEDURE — 3008F PR BODY MASS INDEX (BMI) DOCUMENTED: ICD-10-PCS | Mod: CPTII,S$GLB,, | Performed by: NURSE PRACTITIONER

## 2020-12-10 PROCEDURE — 71046 XR CHEST PA AND LATERAL: ICD-10-PCS | Mod: FY,S$GLB,, | Performed by: RADIOLOGY

## 2020-12-10 PROCEDURE — 99213 PR OFFICE/OUTPT VISIT, EST, LEVL III, 20-29 MIN: ICD-10-PCS | Mod: S$GLB,,, | Performed by: NURSE PRACTITIONER

## 2020-12-10 PROCEDURE — U0002 COVID-19 LAB TEST NON-CDC: HCPCS | Mod: QW,S$GLB,, | Performed by: NURSE PRACTITIONER

## 2020-12-10 PROCEDURE — 3008F BODY MASS INDEX DOCD: CPT | Mod: CPTII,S$GLB,, | Performed by: NURSE PRACTITIONER

## 2020-12-10 PROCEDURE — 99213 OFFICE O/P EST LOW 20 MIN: CPT | Mod: S$GLB,,, | Performed by: NURSE PRACTITIONER

## 2020-12-10 NOTE — PROGRESS NOTES
"Subjective:       Patient ID: Ivan Rodriguez is a 45 y.o. male.    Vitals:  height is 5' 9" (1.753 m) and weight is 93 kg (205 lb). His temperature is 98 °F (36.7 °C). His blood pressure is 127/86 and his pulse is 91. His respiration is 14 and oxygen saturation is 96%.     Chief Complaint: Pneumonia and URI    Patient states concern with recurrence of pneumonia.     URI   This is a new problem. The current episode started 1 to 4 weeks ago. The problem has been gradually improving. There has been no fever. Associated symptoms include congestion and coughing. Pertinent negatives include no abdominal pain, chest pain, diarrhea, dysuria, ear pain, headaches, joint pain, joint swelling, nausea, neck pain, plugged ear sensation, rash, rhinorrhea, sinus pain, sneezing, sore throat, swollen glands, vomiting or wheezing. He has tried nothing for the symptoms.       Constitution: Negative for chills, sweating and fatigue.   HENT: Positive for congestion. Negative for ear pain, sinus pain, sinus pressure, sore throat and voice change.    Neck: Negative for neck pain and painful lymph nodes.   Cardiovascular: Negative for chest pain.   Eyes: Negative for eye redness.   Respiratory: Positive for cough. Negative for chest tightness, COPD, stridor, wheezing and asthma.    Gastrointestinal: Negative for abdominal pain, nausea, vomiting and diarrhea.   Genitourinary: Negative for dysuria.   Skin: Negative for rash.   Allergic/Immunologic: Negative for seasonal allergies, asthma and sneezing.   Neurological: Negative for headaches.   Hematologic/Lymphatic: Negative for swollen lymph nodes.       Objective:      Physical Exam   Constitutional: He is oriented to person, place, and time.  Non-toxic appearance. He does not appear ill. No distress.   Eyes: Pupils are equal, round, and reactive to light. Conjunctivae are normal. extraocular movement intact  Cardiovascular: Normal rate, regular rhythm, S1 normal, S2 normal, normal heart " sounds and normal pulses. Exam reveals no decreased pulses.   Pulmonary/Chest: Effort normal and breath sounds normal. No accessory muscle usage. No respiratory distress. He has no decreased breath sounds. He has no wheezes. He has no rhonchi. He has no rales.   Abdominal: Normal appearance.   Neurological: He is alert and oriented to person, place, and time.   Skin: Skin is warm, dry and not diaphoretic.   Nursing note and vitals reviewed.        Results for orders placed or performed in visit on 12/10/20   POCT COVID-19 Rapid Screening   Result Value Ref Range    POC Rapid COVID Negative Negative     Acceptable Yes       X-ray Chest Pa And Lateral    Result Date: 12/10/2020  EXAMINATION: XR CHEST PA AND LATERAL CLINICAL HISTORY: Cough FINDINGS: Heart size is normal.  Lungs are clear.  The bones show nothing unusual.     No acute process seen. Electronically signed by: Jaam Hall MD Date:    12/10/2020 Time:    16:24    X-ray Chest Pa And Lateral    Result Date: 11/14/2020  EXAMINATION: XR CHEST PA AND LATERAL CLINICAL HISTORY: Cough TECHNIQUE: PA and lateral views of the chest were performed. COMPARISON: None FINDINGS: The trachea is unremarkable.  The cardiomediastinal silhouette is within normal limits.  There is no evidence of free air beneath the hemidiaphragms.  There are no pleural effusions.  There is no evidence of a pneumothorax.  There is no evidence of pneumomediastinum.  There is a right infrahilar airspace opacity.  The osseous structures are unremarkable.     Right infrahilar airspace opacity, suggestive of pneumonia. Electronically signed by: Rony Martinez MD Date:    11/14/2020 Time:    17:44    Assessment:       1. Cough    2. Follow-up exam        Plan:       Patient expressed concerns regarding his recent history of pneumonia and treatment.  Asks if we could do a follow-up chest x-ray on today.  Chest x-ray was completed and reviewed with patient which was normal for no acute  "disease process at this time.     Patient left without is paperwork.  States that he did not needed and that he would pull up on his [facility] account.  Patient denied any further questions or concerns at this time.  Exits exam room in no acute distress.  Cough  -     POCT COVID-19 Rapid Screening  -     X-Ray Chest PA And Lateral; Future; Expected date: 12/10/2020    Follow-up exam      Patient Instructions   Your test was NEGATIVE for COVID-19 (coronavirus).      You may leave home and/or return to work when the following conditions are met:   24 hours fever free without fever-reducing medications AND   Improved symptoms      If your symptoms worsen or if you have any other concerns, please contact [facility] at [phone].     Sincerely,    Som Leonardo NP    Cough   If your condition worsens or fails to improve we recommend that you receive another evaluation at the ER immediately or contact your PCP to discuss your concerns or return here. You must understand that you've received an urgent care treatment only and that you may be released before all your medical problems are known or treated. You the patient will arrange for follouwp care as instructed. .  Rest and fluids are important  Can use honey with charles to soothe your throat  -  Flonase (fluticasone) is a nasal spray which is available over the counter and may help with your symptoms.   -  If you have hypertension avoid using the "D" which is the decongestant.  Instead you can use Coricidin HBP for cold and cough symptoms.    -  If you just have drainage you can take plain Zyrtec, Claritin or Allegra   -  Tylenol or ibuprofen can also be used as directed for pain unless you have an allergy to them or medical condition such as stomach ulcers, kidney or liver disease or blood thinners etc for which you should not be taking these type of medications.   Please follow up with your primary care doctor or specialist in the next 48-72hrs as " needed and if no improvement  If you  smoke, please stop smoking.

## 2020-12-10 NOTE — PATIENT INSTRUCTIONS
"Your test was NEGATIVE for COVID-19 (coronavirus).      You may leave home and/or return to work when the following conditions are met:   24 hours fever free without fever-reducing medications AND   Improved symptoms      If your symptoms worsen or if you have any other concerns, please contact [facility] at [phone].     Sincerely,    Som Leonardo, MELANY    Cough   If your condition worsens or fails to improve we recommend that you receive another evaluation at the ER immediately or contact your PCP to discuss your concerns or return here. You must understand that you've received an urgent care treatment only and that you may be released before all your medical problems are known or treated. You the patient will arrange for follouw care as instructed. .  Rest and fluids are important  Can use honey with charles to soothe your throat  -  Flonase (fluticasone) is a nasal spray which is available over the counter and may help with your symptoms.   -  If you have hypertension avoid using the "D" which is the decongestant.  Instead you can use Coricidin HBP for cold and cough symptoms.    -  If you just have drainage you can take plain Zyrtec, Claritin or Allegra   -  Tylenol or ibuprofen can also be used as directed for pain unless you have an allergy to them or medical condition such as stomach ulcers, kidney or liver disease or blood thinners etc for which you should not be taking these type of medications.   Please follow up with your primary care doctor or specialist in the next 48-72hrs as needed and if no improvement  If you  smoke, please stop smoking.    "

## 2021-01-07 ENCOUNTER — CLINICAL SUPPORT (OUTPATIENT)
Dept: URGENT CARE | Facility: CLINIC | Age: 46
End: 2021-01-07
Payer: COMMERCIAL

## 2021-01-07 VITALS — OXYGEN SATURATION: 96 % | HEART RATE: 101 BPM | TEMPERATURE: 98 F

## 2021-01-07 DIAGNOSIS — U07.1 COVID-19 VIRUS DETECTED: ICD-10-CM

## 2021-01-07 DIAGNOSIS — Z03.818 ENCOUNTER FOR OBSERVATION FOR SUSPECTED EXPOSURE TO OTHER BIOLOGICAL AGENTS RULED OUT: Primary | ICD-10-CM

## 2021-01-07 LAB
CTP QC/QA: YES
SARS-COV-2 RDRP RESP QL NAA+PROBE: POSITIVE

## 2021-01-07 PROCEDURE — U0002 COVID-19 LAB TEST NON-CDC: HCPCS | Mod: QW,S$GLB,, | Performed by: INTERNAL MEDICINE

## 2021-01-07 PROCEDURE — U0002: ICD-10-PCS | Mod: QW,S$GLB,, | Performed by: INTERNAL MEDICINE

## 2021-02-02 ENCOUNTER — PATIENT MESSAGE (OUTPATIENT)
Dept: ENDOCRINOLOGY | Facility: CLINIC | Age: 46
End: 2021-02-02

## 2021-02-02 DIAGNOSIS — E29.1 MALE HYPOGONADISM: ICD-10-CM

## 2021-02-02 DIAGNOSIS — E03.9 ACQUIRED HYPOTHYROIDISM: ICD-10-CM

## 2021-02-02 RX ORDER — CLOMIPHENE CITRATE 50 MG/1
50 TABLET ORAL EVERY OTHER DAY
Qty: 45 TABLET | Refills: 3 | Status: SHIPPED | OUTPATIENT
Start: 2021-02-02 | End: 2021-06-09

## 2021-02-02 RX ORDER — LEVOTHYROXINE SODIUM 150 MCG
150 TABLET ORAL DAILY
Qty: 30 TABLET | Refills: 11 | Status: SHIPPED | OUTPATIENT
Start: 2021-02-02 | End: 2021-07-28

## 2021-02-11 DIAGNOSIS — E03.9 ACQUIRED HYPOTHYROIDISM: ICD-10-CM

## 2021-02-12 ENCOUNTER — TELEPHONE (OUTPATIENT)
Dept: ENDOCRINOLOGY | Facility: CLINIC | Age: 46
End: 2021-02-12

## 2021-04-16 ENCOUNTER — PATIENT MESSAGE (OUTPATIENT)
Dept: RESEARCH | Facility: HOSPITAL | Age: 46
End: 2021-04-16

## 2021-05-01 ENCOUNTER — PATIENT MESSAGE (OUTPATIENT)
Dept: ENDOCRINOLOGY | Facility: CLINIC | Age: 46
End: 2021-05-01

## 2021-05-01 ENCOUNTER — PATIENT MESSAGE (OUTPATIENT)
Dept: GASTROENTEROLOGY | Facility: CLINIC | Age: 46
End: 2021-05-01

## 2021-05-01 DIAGNOSIS — R14.0 ABDOMINAL DISTENSION: Primary | ICD-10-CM

## 2021-05-01 DIAGNOSIS — K50.00 TERMINAL ILEITIS WITHOUT COMPLICATION: ICD-10-CM

## 2021-05-01 DIAGNOSIS — K52.9 CHRONIC DIARRHEA: ICD-10-CM

## 2021-05-01 DIAGNOSIS — Z87.11 HISTORY OF GASTRIC ULCER: ICD-10-CM

## 2021-05-01 DIAGNOSIS — K21.9 GASTROESOPHAGEAL REFLUX DISEASE, UNSPECIFIED WHETHER ESOPHAGITIS PRESENT: ICD-10-CM

## 2021-06-19 ENCOUNTER — PATIENT MESSAGE (OUTPATIENT)
Dept: ENDOCRINOLOGY | Facility: CLINIC | Age: 46
End: 2021-06-19

## 2021-06-19 DIAGNOSIS — E03.9 ACQUIRED HYPOTHYROIDISM: Primary | ICD-10-CM

## 2021-06-19 DIAGNOSIS — E29.1 MALE HYPOGONADISM: ICD-10-CM

## 2021-06-19 DIAGNOSIS — K90.0 CELIAC DISEASE: ICD-10-CM

## 2021-06-21 ENCOUNTER — PATIENT MESSAGE (OUTPATIENT)
Dept: ENDOCRINOLOGY | Facility: CLINIC | Age: 46
End: 2021-06-21

## 2021-07-09 ENCOUNTER — LAB VISIT (OUTPATIENT)
Dept: LAB | Facility: HOSPITAL | Age: 46
End: 2021-07-09
Attending: INTERNAL MEDICINE
Payer: COMMERCIAL

## 2021-07-09 DIAGNOSIS — E29.1 MALE HYPOGONADISM: ICD-10-CM

## 2021-07-09 DIAGNOSIS — K90.0 CELIAC DISEASE: ICD-10-CM

## 2021-07-09 DIAGNOSIS — E03.9 ACQUIRED HYPOTHYROIDISM: ICD-10-CM

## 2021-07-09 LAB
ALBUMIN SERPL BCP-MCNC: 4.1 G/DL (ref 3.5–5.2)
ALP SERPL-CCNC: 72 U/L (ref 55–135)
ALT SERPL W/O P-5'-P-CCNC: 44 U/L (ref 10–44)
ANION GAP SERPL CALC-SCNC: 10 MMOL/L (ref 8–16)
AST SERPL-CCNC: 39 U/L (ref 10–40)
BASOPHILS # BLD AUTO: 0.03 K/UL (ref 0–0.2)
BASOPHILS NFR BLD: 0.6 % (ref 0–1.9)
BILIRUB SERPL-MCNC: 1.3 MG/DL (ref 0.1–1)
BUN SERPL-MCNC: 15 MG/DL (ref 6–20)
CALCIUM SERPL-MCNC: 9.3 MG/DL (ref 8.7–10.5)
CHLORIDE SERPL-SCNC: 106 MMOL/L (ref 95–110)
CO2 SERPL-SCNC: 23 MMOL/L (ref 23–29)
CREAT SERPL-MCNC: 1.4 MG/DL (ref 0.5–1.4)
CRP SERPL-MCNC: 1.3 MG/L (ref 0–8.2)
DIFFERENTIAL METHOD: ABNORMAL
EOSINOPHIL # BLD AUTO: 0.1 K/UL (ref 0–0.5)
EOSINOPHIL NFR BLD: 1.7 % (ref 0–8)
ERYTHROCYTE [DISTWIDTH] IN BLOOD BY AUTOMATED COUNT: 13.8 % (ref 11.5–14.5)
EST. GFR  (AFRICAN AMERICAN): >60 ML/MIN/1.73 M^2
EST. GFR  (NON AFRICAN AMERICAN): 59.8 ML/MIN/1.73 M^2
ESTIMATED AVG GLUCOSE: 123 MG/DL (ref 68–131)
FSH SERPL-ACNC: 8 MIU/ML (ref 0.95–11.95)
GLUCOSE SERPL-MCNC: 105 MG/DL (ref 70–110)
HBA1C MFR BLD: 5.9 % (ref 4–5.6)
HCT VFR BLD AUTO: 43.7 % (ref 40–54)
HGB BLD-MCNC: 14 G/DL (ref 14–18)
IMM GRANULOCYTES # BLD AUTO: 0.03 K/UL (ref 0–0.04)
IMM GRANULOCYTES NFR BLD AUTO: 0.6 % (ref 0–0.5)
LH SERPL-ACNC: 1.7 MIU/ML (ref 0.6–12.1)
LYMPHOCYTES # BLD AUTO: 1.7 K/UL (ref 1–4.8)
LYMPHOCYTES NFR BLD: 32.2 % (ref 18–48)
MCH RBC QN AUTO: 27 PG (ref 27–31)
MCHC RBC AUTO-ENTMCNC: 32 G/DL (ref 32–36)
MCV RBC AUTO: 84 FL (ref 82–98)
MONOCYTES # BLD AUTO: 0.5 K/UL (ref 0.3–1)
MONOCYTES NFR BLD: 8.5 % (ref 4–15)
NEUTROPHILS # BLD AUTO: 3 K/UL (ref 1.8–7.7)
NEUTROPHILS NFR BLD: 56.4 % (ref 38–73)
NRBC BLD-RTO: 0 /100 WBC
PLATELET # BLD AUTO: 240 K/UL (ref 150–450)
PMV BLD AUTO: 10.3 FL (ref 9.2–12.9)
POTASSIUM SERPL-SCNC: 4.3 MMOL/L (ref 3.5–5.1)
PROT SERPL-MCNC: 7.2 G/DL (ref 6–8.4)
RBC # BLD AUTO: 5.18 M/UL (ref 4.6–6.2)
SODIUM SERPL-SCNC: 139 MMOL/L (ref 136–145)
T4 FREE SERPL-MCNC: 1.06 NG/DL (ref 0.71–1.51)
TSH SERPL DL<=0.005 MIU/L-ACNC: 3.35 UIU/ML (ref 0.4–4)
WBC # BLD AUTO: 5.28 K/UL (ref 3.9–12.7)

## 2021-07-09 PROCEDURE — 86140 C-REACTIVE PROTEIN: CPT | Performed by: INTERNAL MEDICINE

## 2021-07-09 PROCEDURE — 83036 HEMOGLOBIN GLYCOSYLATED A1C: CPT | Performed by: INTERNAL MEDICINE

## 2021-07-09 PROCEDURE — 84403 ASSAY OF TOTAL TESTOSTERONE: CPT | Performed by: INTERNAL MEDICINE

## 2021-07-09 PROCEDURE — 80053 COMPREHEN METABOLIC PANEL: CPT | Performed by: INTERNAL MEDICINE

## 2021-07-09 PROCEDURE — 84443 ASSAY THYROID STIM HORMONE: CPT | Performed by: INTERNAL MEDICINE

## 2021-07-09 PROCEDURE — 36415 COLL VENOUS BLD VENIPUNCTURE: CPT | Performed by: INTERNAL MEDICINE

## 2021-07-09 PROCEDURE — 83001 ASSAY OF GONADOTROPIN (FSH): CPT | Performed by: INTERNAL MEDICINE

## 2021-07-09 PROCEDURE — 83002 ASSAY OF GONADOTROPIN (LH): CPT | Performed by: INTERNAL MEDICINE

## 2021-07-09 PROCEDURE — 84439 ASSAY OF FREE THYROXINE: CPT | Performed by: INTERNAL MEDICINE

## 2021-07-09 PROCEDURE — 85025 COMPLETE CBC W/AUTO DIFF WBC: CPT | Performed by: INTERNAL MEDICINE

## 2021-07-10 ENCOUNTER — PATIENT MESSAGE (OUTPATIENT)
Dept: ENDOCRINOLOGY | Facility: CLINIC | Age: 46
End: 2021-07-10

## 2021-07-10 DIAGNOSIS — R73.03 PREDIABETES: Primary | ICD-10-CM

## 2021-07-10 DIAGNOSIS — E03.9 ACQUIRED HYPOTHYROIDISM: ICD-10-CM

## 2021-07-13 ENCOUNTER — PATIENT MESSAGE (OUTPATIENT)
Dept: ENDOCRINOLOGY | Facility: CLINIC | Age: 46
End: 2021-07-13

## 2021-07-15 LAB
ALBUMIN SERPL-MCNC: 4.3 G/DL (ref 3.6–5.1)
SHBG SERPL-SCNC: 30 NMOL/L (ref 10–50)
TESTOST FREE SERPL-MCNC: 72 PG/ML (ref 46–224)
TESTOST SERPL-MCNC: 488 NG/DL (ref 250–1100)
TESTOSTERONE.FREE+WB SERPL-MCNC: 141.8 NG/DL (ref 110–575)

## 2021-07-16 ENCOUNTER — LAB VISIT (OUTPATIENT)
Dept: LAB | Facility: HOSPITAL | Age: 46
End: 2021-07-16
Attending: INTERNAL MEDICINE
Payer: COMMERCIAL

## 2021-07-16 DIAGNOSIS — R73.03 PREDIABETES: ICD-10-CM

## 2021-07-16 DIAGNOSIS — E03.9 ACQUIRED HYPOTHYROIDISM: ICD-10-CM

## 2021-07-16 LAB
CHOLEST SERPL-MCNC: 112 MG/DL (ref 120–199)
CHOLEST/HDLC SERPL: 3.6 {RATIO} (ref 2–5)
HDLC SERPL-MCNC: 31 MG/DL (ref 40–75)
HDLC SERPL: 27.7 % (ref 20–50)
LDLC SERPL CALC-MCNC: 58 MG/DL (ref 63–159)
NONHDLC SERPL-MCNC: 81 MG/DL
TRIGL SERPL-MCNC: 115 MG/DL (ref 30–150)

## 2021-07-16 PROCEDURE — 36415 COLL VENOUS BLD VENIPUNCTURE: CPT | Performed by: INTERNAL MEDICINE

## 2021-07-16 PROCEDURE — 80061 LIPID PANEL: CPT | Performed by: INTERNAL MEDICINE

## 2021-07-28 ENCOUNTER — OFFICE VISIT (OUTPATIENT)
Dept: ENDOCRINOLOGY | Facility: CLINIC | Age: 46
End: 2021-07-28
Payer: COMMERCIAL

## 2021-07-28 VITALS
BODY MASS INDEX: 31.43 KG/M2 | HEART RATE: 96 BPM | WEIGHT: 212.19 LBS | SYSTOLIC BLOOD PRESSURE: 142 MMHG | DIASTOLIC BLOOD PRESSURE: 80 MMHG | HEIGHT: 69 IN

## 2021-07-28 DIAGNOSIS — G47.33 OBSTRUCTIVE SLEEP APNEA: ICD-10-CM

## 2021-07-28 DIAGNOSIS — K90.0 CELIAC DISEASE: ICD-10-CM

## 2021-07-28 DIAGNOSIS — E03.9 ACQUIRED HYPOTHYROIDISM: Primary | ICD-10-CM

## 2021-07-28 DIAGNOSIS — E29.1 MALE HYPOGONADISM: ICD-10-CM

## 2021-07-28 PROCEDURE — 3008F BODY MASS INDEX DOCD: CPT | Mod: CPTII,S$GLB,, | Performed by: INTERNAL MEDICINE

## 2021-07-28 PROCEDURE — 99214 PR OFFICE/OUTPT VISIT, EST, LEVL IV, 30-39 MIN: ICD-10-PCS | Mod: S$GLB,,, | Performed by: INTERNAL MEDICINE

## 2021-07-28 PROCEDURE — 3008F PR BODY MASS INDEX (BMI) DOCUMENTED: ICD-10-PCS | Mod: CPTII,S$GLB,, | Performed by: INTERNAL MEDICINE

## 2021-07-28 PROCEDURE — 1126F PR PAIN SEVERITY QUANTIFIED, NO PAIN PRESENT: ICD-10-PCS | Mod: CPTII,S$GLB,, | Performed by: INTERNAL MEDICINE

## 2021-07-28 PROCEDURE — 99999 PR PBB SHADOW E&M-EST. PATIENT-LVL III: ICD-10-PCS | Mod: PBBFAC,,, | Performed by: INTERNAL MEDICINE

## 2021-07-28 PROCEDURE — 1159F MED LIST DOCD IN RCRD: CPT | Mod: CPTII,S$GLB,, | Performed by: INTERNAL MEDICINE

## 2021-07-28 PROCEDURE — 1126F AMNT PAIN NOTED NONE PRSNT: CPT | Mod: CPTII,S$GLB,, | Performed by: INTERNAL MEDICINE

## 2021-07-28 PROCEDURE — 1160F PR REVIEW ALL MEDS BY PRESCRIBER/CLIN PHARMACIST DOCUMENTED: ICD-10-PCS | Mod: CPTII,S$GLB,, | Performed by: INTERNAL MEDICINE

## 2021-07-28 PROCEDURE — 1160F RVW MEDS BY RX/DR IN RCRD: CPT | Mod: CPTII,S$GLB,, | Performed by: INTERNAL MEDICINE

## 2021-07-28 PROCEDURE — 99999 PR PBB SHADOW E&M-EST. PATIENT-LVL III: CPT | Mod: PBBFAC,,, | Performed by: INTERNAL MEDICINE

## 2021-07-28 PROCEDURE — 1159F PR MEDICATION LIST DOCUMENTED IN MEDICAL RECORD: ICD-10-PCS | Mod: CPTII,S$GLB,, | Performed by: INTERNAL MEDICINE

## 2021-07-28 PROCEDURE — 99214 OFFICE O/P EST MOD 30 MIN: CPT | Mod: S$GLB,,, | Performed by: INTERNAL MEDICINE

## 2021-07-28 RX ORDER — CLOMIPHENE CITRATE 50 MG/1
50 TABLET ORAL EVERY OTHER DAY
Qty: 45 TABLET | Refills: 3 | Status: SHIPPED | OUTPATIENT
Start: 2021-07-28 | End: 2021-10-25 | Stop reason: SDUPTHER

## 2021-07-28 RX ORDER — LEVOTHYROXINE SODIUM 175 UG/1
175 TABLET ORAL
Qty: 90 TABLET | Refills: 3 | Status: SHIPPED | OUTPATIENT
Start: 2021-07-28 | End: 2022-04-14

## 2021-09-27 ENCOUNTER — TELEPHONE (OUTPATIENT)
Dept: ENDOSCOPY | Facility: HOSPITAL | Age: 46
End: 2021-09-27

## 2021-09-27 DIAGNOSIS — Z12.11 SPECIAL SCREENING FOR MALIGNANT NEOPLASMS, COLON: Primary | ICD-10-CM

## 2021-09-27 DIAGNOSIS — Z01.818 PRE-OP TESTING: Primary | ICD-10-CM

## 2021-09-27 RX ORDER — SODIUM, POTASSIUM,MAG SULFATES 17.5-3.13G
SOLUTION, RECONSTITUTED, ORAL ORAL
Qty: 1 BOTTLE | Refills: 0 | Status: ON HOLD | OUTPATIENT
Start: 2021-09-27 | End: 2021-09-29 | Stop reason: HOSPADM

## 2021-09-29 ENCOUNTER — ANESTHESIA EVENT (OUTPATIENT)
Dept: ENDOSCOPY | Facility: HOSPITAL | Age: 46
End: 2021-09-29
Payer: COMMERCIAL

## 2021-09-29 ENCOUNTER — ANESTHESIA (OUTPATIENT)
Dept: ENDOSCOPY | Facility: HOSPITAL | Age: 46
End: 2021-09-29
Payer: COMMERCIAL

## 2021-09-29 ENCOUNTER — HOSPITAL ENCOUNTER (OUTPATIENT)
Facility: HOSPITAL | Age: 46
Discharge: HOME | End: 2021-09-29
Attending: INTERNAL MEDICINE | Admitting: INTERNAL MEDICINE
Payer: COMMERCIAL

## 2021-09-29 VITALS
SYSTOLIC BLOOD PRESSURE: 130 MMHG | OXYGEN SATURATION: 99 % | TEMPERATURE: 98 F | BODY MASS INDEX: 29.62 KG/M2 | WEIGHT: 200 LBS | RESPIRATION RATE: 18 BRPM | HEIGHT: 69 IN | HEART RATE: 60 BPM | DIASTOLIC BLOOD PRESSURE: 78 MMHG

## 2021-09-29 DIAGNOSIS — K52.9 ILEITIS: ICD-10-CM

## 2021-09-29 DIAGNOSIS — K90.0 CELIAC DISEASE: ICD-10-CM

## 2021-09-29 DIAGNOSIS — R10.84 ABDOMINAL PAIN, GENERALIZED: Primary | ICD-10-CM

## 2021-09-29 LAB — SARS-COV-2 RDRP RESP QL NAA+PROBE: NEGATIVE

## 2021-09-29 PROCEDURE — 45385 COLONOSCOPY W/LESION REMOVAL: CPT | Mod: ,,, | Performed by: INTERNAL MEDICINE

## 2021-09-29 PROCEDURE — 82657 ENZYME CELL ACTIVITY: CPT | Performed by: PATHOLOGY

## 2021-09-29 PROCEDURE — 43239 PR EGD, FLEX, W/BIOPSY, SGL/MULTI: ICD-10-PCS | Mod: 51,,, | Performed by: INTERNAL MEDICINE

## 2021-09-29 PROCEDURE — 37000008 HC ANESTHESIA 1ST 15 MINUTES: Performed by: INTERNAL MEDICINE

## 2021-09-29 PROCEDURE — 43239 EGD BIOPSY SINGLE/MULTIPLE: CPT | Performed by: INTERNAL MEDICINE

## 2021-09-29 PROCEDURE — 45385 PR COLONOSCOPY,REMV LESN,SNARE: ICD-10-PCS | Mod: ,,, | Performed by: INTERNAL MEDICINE

## 2021-09-29 PROCEDURE — 45380 PR COLONOSCOPY,BIOPSY: ICD-10-PCS | Mod: 59,,, | Performed by: INTERNAL MEDICINE

## 2021-09-29 PROCEDURE — 27201089 HC SNARE, DISP (ANY): Performed by: INTERNAL MEDICINE

## 2021-09-29 PROCEDURE — 25000003 PHARM REV CODE 250: Performed by: REGISTERED NURSE

## 2021-09-29 PROCEDURE — 37000009 HC ANESTHESIA EA ADD 15 MINS: Performed by: INTERNAL MEDICINE

## 2021-09-29 PROCEDURE — 43239 EGD BIOPSY SINGLE/MULTIPLE: CPT | Mod: 51,,, | Performed by: INTERNAL MEDICINE

## 2021-09-29 PROCEDURE — 27201012 HC FORCEPS, HOT/COLD, DISP: Performed by: INTERNAL MEDICINE

## 2021-09-29 PROCEDURE — 88305 TISSUE EXAM BY PATHOLOGIST: CPT | Mod: 26,,, | Performed by: PATHOLOGY

## 2021-09-29 PROCEDURE — 88305 TISSUE EXAM BY PATHOLOGIST: ICD-10-PCS | Mod: 26,,, | Performed by: PATHOLOGY

## 2021-09-29 PROCEDURE — 88305 TISSUE EXAM BY PATHOLOGIST: CPT | Mod: 59 | Performed by: PATHOLOGY

## 2021-09-29 PROCEDURE — 63600175 PHARM REV CODE 636 W HCPCS: Performed by: REGISTERED NURSE

## 2021-09-29 PROCEDURE — 45380 COLONOSCOPY AND BIOPSY: CPT | Mod: 59,,, | Performed by: INTERNAL MEDICINE

## 2021-09-29 PROCEDURE — 45380 COLONOSCOPY AND BIOPSY: CPT | Performed by: INTERNAL MEDICINE

## 2021-09-29 PROCEDURE — U0002 COVID-19 LAB TEST NON-CDC: HCPCS | Performed by: INTERNAL MEDICINE

## 2021-09-29 PROCEDURE — 45385 COLONOSCOPY W/LESION REMOVAL: CPT | Performed by: INTERNAL MEDICINE

## 2021-09-29 PROCEDURE — E9220 PRA ENDO ANESTHESIA: ICD-10-PCS | Mod: ,,, | Performed by: REGISTERED NURSE

## 2021-09-29 PROCEDURE — 45381 COLONOSCOPY SUBMUCOUS NJX: CPT | Performed by: INTERNAL MEDICINE

## 2021-09-29 PROCEDURE — 27202298: Performed by: INTERNAL MEDICINE

## 2021-09-29 PROCEDURE — 45381 PR COLONOSCPY,FLEX,W/DIR SUBMUC INJECT: ICD-10-PCS | Mod: 51,,, | Performed by: INTERNAL MEDICINE

## 2021-09-29 PROCEDURE — 45381 COLONOSCOPY SUBMUCOUS NJX: CPT | Mod: 51,,, | Performed by: INTERNAL MEDICINE

## 2021-09-29 PROCEDURE — E9220 PRA ENDO ANESTHESIA: HCPCS | Mod: ,,, | Performed by: REGISTERED NURSE

## 2021-09-29 RX ORDER — PROPOFOL 10 MG/ML
VIAL (ML) INTRAVENOUS CONTINUOUS PRN
Status: DISCONTINUED | OUTPATIENT
Start: 2021-09-29 | End: 2021-09-30

## 2021-09-29 RX ORDER — PROPOFOL 10 MG/ML
VIAL (ML) INTRAVENOUS
Status: DISCONTINUED | OUTPATIENT
Start: 2021-09-29 | End: 2021-09-30

## 2021-09-29 RX ORDER — SODIUM CHLORIDE 9 MG/ML
INJECTION, SOLUTION INTRAVENOUS CONTINUOUS
Status: DISCONTINUED | OUTPATIENT
Start: 2021-09-29 | End: 2021-09-29 | Stop reason: HOSPADM

## 2021-09-29 RX ORDER — LIDOCAINE HYDROCHLORIDE 20 MG/ML
INJECTION INTRAVENOUS
Status: DISCONTINUED | OUTPATIENT
Start: 2021-09-29 | End: 2021-09-30

## 2021-09-29 RX ORDER — PHENYLEPHRINE HYDROCHLORIDE 10 MG/ML
INJECTION INTRAVENOUS
Status: DISCONTINUED | OUTPATIENT
Start: 2021-09-29 | End: 2021-09-30

## 2021-09-29 RX ORDER — SODIUM CHLORIDE 9 MG/ML
INJECTION, SOLUTION INTRAVENOUS CONTINUOUS PRN
Status: DISCONTINUED | OUTPATIENT
Start: 2021-09-29 | End: 2021-09-30

## 2021-09-29 RX ADMIN — PHENYLEPHRINE HYDROCHLORIDE 100 MCG: 10 INJECTION INTRAVENOUS at 03:09

## 2021-09-29 RX ADMIN — PROPOFOL 200 MCG/KG/MIN: 10 INJECTION, EMULSION INTRAVENOUS at 02:09

## 2021-09-29 RX ADMIN — SODIUM CHLORIDE: 9 INJECTION, SOLUTION INTRAVENOUS at 02:09

## 2021-09-29 RX ADMIN — PROPOFOL 120 MG: 10 INJECTION, EMULSION INTRAVENOUS at 02:09

## 2021-09-29 RX ADMIN — LIDOCAINE HYDROCHLORIDE 100 MG: 20 INJECTION, SOLUTION INTRAVENOUS at 02:09

## 2021-09-29 RX ADMIN — GLYCOPYRROLATE 0.2 MG: 0.2 INJECTION, SOLUTION INTRAMUSCULAR; INTRAVITREAL at 02:09

## 2021-09-30 ENCOUNTER — PATIENT MESSAGE (OUTPATIENT)
Dept: ENDOCRINOLOGY | Facility: CLINIC | Age: 46
End: 2021-09-30

## 2021-09-30 ENCOUNTER — PATIENT MESSAGE (OUTPATIENT)
Dept: GASTROENTEROLOGY | Facility: CLINIC | Age: 46
End: 2021-09-30

## 2021-10-04 ENCOUNTER — PATIENT MESSAGE (OUTPATIENT)
Dept: ADMINISTRATIVE | Facility: HOSPITAL | Age: 46
End: 2021-10-04

## 2021-10-05 ENCOUNTER — PATIENT MESSAGE (OUTPATIENT)
Dept: GASTROENTEROLOGY | Facility: CLINIC | Age: 46
End: 2021-10-05

## 2021-10-05 LAB
FINAL PATHOLOGIC DIAGNOSIS: NORMAL
FINAL PATHOLOGIC DIAGNOSIS: NORMAL
GROSS: NORMAL
GROSS: NORMAL
Lab: NORMAL
Lab: NORMAL

## 2021-10-11 ENCOUNTER — TELEPHONE (OUTPATIENT)
Dept: ENDOSCOPY | Facility: HOSPITAL | Age: 46
End: 2021-10-11

## 2021-10-20 ENCOUNTER — OFFICE VISIT (OUTPATIENT)
Dept: GASTROENTEROLOGY | Facility: CLINIC | Age: 46
End: 2021-10-20
Payer: COMMERCIAL

## 2021-10-20 VITALS
HEIGHT: 69 IN | WEIGHT: 209.69 LBS | HEART RATE: 86 BPM | BODY MASS INDEX: 31.06 KG/M2 | SYSTOLIC BLOOD PRESSURE: 140 MMHG | DIASTOLIC BLOOD PRESSURE: 76 MMHG

## 2021-10-20 DIAGNOSIS — R10.13 DYSPEPSIA: ICD-10-CM

## 2021-10-20 DIAGNOSIS — Z86.010 HISTORY OF COLON POLYPS: ICD-10-CM

## 2021-10-20 DIAGNOSIS — R10.30 LOWER ABDOMINAL PAIN: Primary | ICD-10-CM

## 2021-10-20 PROBLEM — K52.9 ILEITIS: Status: RESOLVED | Noted: 2021-09-29 | Resolved: 2021-10-20

## 2021-10-20 PROCEDURE — 3077F SYST BP >= 140 MM HG: CPT | Mod: CPTII,S$GLB,, | Performed by: INTERNAL MEDICINE

## 2021-10-20 PROCEDURE — 99214 PR OFFICE/OUTPT VISIT, EST, LEVL IV, 30-39 MIN: ICD-10-PCS | Mod: S$GLB,,, | Performed by: INTERNAL MEDICINE

## 2021-10-20 PROCEDURE — 3008F BODY MASS INDEX DOCD: CPT | Mod: CPTII,S$GLB,, | Performed by: INTERNAL MEDICINE

## 2021-10-20 PROCEDURE — 3008F PR BODY MASS INDEX (BMI) DOCUMENTED: ICD-10-PCS | Mod: CPTII,S$GLB,, | Performed by: INTERNAL MEDICINE

## 2021-10-20 PROCEDURE — 99214 OFFICE O/P EST MOD 30 MIN: CPT | Mod: S$GLB,,, | Performed by: INTERNAL MEDICINE

## 2021-10-20 PROCEDURE — 3078F DIAST BP <80 MM HG: CPT | Mod: CPTII,S$GLB,, | Performed by: INTERNAL MEDICINE

## 2021-10-20 PROCEDURE — 3078F PR MOST RECENT DIASTOLIC BLOOD PRESSURE < 80 MM HG: ICD-10-PCS | Mod: CPTII,S$GLB,, | Performed by: INTERNAL MEDICINE

## 2021-10-20 PROCEDURE — 3044F HG A1C LEVEL LT 7.0%: CPT | Mod: CPTII,S$GLB,, | Performed by: INTERNAL MEDICINE

## 2021-10-20 PROCEDURE — 1160F PR REVIEW ALL MEDS BY PRESCRIBER/CLIN PHARMACIST DOCUMENTED: ICD-10-PCS | Mod: CPTII,S$GLB,, | Performed by: INTERNAL MEDICINE

## 2021-10-20 PROCEDURE — 3044F PR MOST RECENT HEMOGLOBIN A1C LEVEL <7.0%: ICD-10-PCS | Mod: CPTII,S$GLB,, | Performed by: INTERNAL MEDICINE

## 2021-10-20 PROCEDURE — 99999 PR PBB SHADOW E&M-EST. PATIENT-LVL III: CPT | Mod: PBBFAC,,, | Performed by: INTERNAL MEDICINE

## 2021-10-20 PROCEDURE — 99999 PR PBB SHADOW E&M-EST. PATIENT-LVL III: ICD-10-PCS | Mod: PBBFAC,,, | Performed by: INTERNAL MEDICINE

## 2021-10-20 PROCEDURE — 3077F PR MOST RECENT SYSTOLIC BLOOD PRESSURE >= 140 MM HG: ICD-10-PCS | Mod: CPTII,S$GLB,, | Performed by: INTERNAL MEDICINE

## 2021-10-20 PROCEDURE — 1159F MED LIST DOCD IN RCRD: CPT | Mod: CPTII,S$GLB,, | Performed by: INTERNAL MEDICINE

## 2021-10-20 PROCEDURE — 1160F RVW MEDS BY RX/DR IN RCRD: CPT | Mod: CPTII,S$GLB,, | Performed by: INTERNAL MEDICINE

## 2021-10-20 PROCEDURE — 1159F PR MEDICATION LIST DOCUMENTED IN MEDICAL RECORD: ICD-10-PCS | Mod: CPTII,S$GLB,, | Performed by: INTERNAL MEDICINE

## 2021-10-25 ENCOUNTER — PATIENT MESSAGE (OUTPATIENT)
Dept: ENDOCRINOLOGY | Facility: CLINIC | Age: 46
End: 2021-10-25
Payer: COMMERCIAL

## 2021-10-25 DIAGNOSIS — E29.1 MALE HYPOGONADISM: ICD-10-CM

## 2021-10-25 RX ORDER — CLOMIPHENE CITRATE 50 MG/1
50 TABLET ORAL EVERY OTHER DAY
Qty: 5 TABLET | Refills: 0 | Status: SHIPPED | OUTPATIENT
Start: 2021-10-25 | End: 2022-08-30

## 2021-11-01 ENCOUNTER — PATIENT MESSAGE (OUTPATIENT)
Dept: ENDOCRINOLOGY | Facility: CLINIC | Age: 46
End: 2021-11-01
Payer: COMMERCIAL

## 2021-11-01 DIAGNOSIS — N52.9 ERECTILE DYSFUNCTION, UNSPECIFIED ERECTILE DYSFUNCTION TYPE: ICD-10-CM

## 2021-11-01 DIAGNOSIS — E29.1 MALE HYPOGONADISM: Primary | ICD-10-CM

## 2021-11-01 RX ORDER — TADALAFIL 5 MG/1
5 TABLET ORAL DAILY PRN
Qty: 30 TABLET | Refills: 3 | Status: SHIPPED | OUTPATIENT
Start: 2021-11-01 | End: 2022-05-03 | Stop reason: SDUPTHER

## 2022-03-21 ENCOUNTER — TELEPHONE (OUTPATIENT)
Dept: PULMONOLOGY | Facility: CLINIC | Age: 47
End: 2022-03-21
Payer: COMMERCIAL

## 2022-03-21 NOTE — TELEPHONE ENCOUNTER
LVM for patient to return my call. I was calling to schedule patient for an appointment with Dr Suazo. Patient to call back to discuss.

## 2022-03-22 ENCOUNTER — TELEPHONE (OUTPATIENT)
Dept: PULMONOLOGY | Facility: CLINIC | Age: 47
End: 2022-03-22
Payer: COMMERCIAL

## 2022-03-22 NOTE — TELEPHONE ENCOUNTER
I spoke to patient to schedule with Dr Suazo on 3/24/22 at 4pm. Patient confirmed and verbalized understanding.

## 2022-03-22 NOTE — TELEPHONE ENCOUNTER
----- Message from Amanda Gallagher sent at 3/22/2022  3:12 PM CDT -----  YASMIN SIMMS calling regarding miss call from [employer].  [phone]

## 2022-03-24 ENCOUNTER — HOSPITAL ENCOUNTER (OUTPATIENT)
Dept: PULMONOLOGY | Facility: CLINIC | Age: 47
Discharge: HOME | End: 2022-03-24
Payer: COMMERCIAL

## 2022-03-24 ENCOUNTER — OFFICE VISIT (OUTPATIENT)
Dept: PULMONOLOGY | Facility: CLINIC | Age: 47
End: 2022-03-24
Payer: COMMERCIAL

## 2022-03-24 ENCOUNTER — TELEPHONE (OUTPATIENT)
Dept: PULMONOLOGY | Facility: CLINIC | Age: 47
End: 2022-03-24
Payer: COMMERCIAL

## 2022-03-24 VITALS
WEIGHT: 199.5 LBS | OXYGEN SATURATION: 97 % | BODY MASS INDEX: 29.55 KG/M2 | HEART RATE: 98 BPM | HEIGHT: 69 IN | DIASTOLIC BLOOD PRESSURE: 84 MMHG | SYSTOLIC BLOOD PRESSURE: 128 MMHG

## 2022-03-24 DIAGNOSIS — J18.9 PNEUMONIA OF BOTH LUNGS DUE TO INFECTIOUS ORGANISM, UNSPECIFIED PART OF LUNG: Primary | ICD-10-CM

## 2022-03-24 DIAGNOSIS — R06.02 SOB (SHORTNESS OF BREATH): ICD-10-CM

## 2022-03-24 DIAGNOSIS — R06.02 SOB (SHORTNESS OF BREATH): Primary | ICD-10-CM

## 2022-03-24 PROCEDURE — 3079F PR MOST RECENT DIASTOLIC BLOOD PRESSURE 80-89 MM HG: ICD-10-PCS | Mod: CPTII,S$GLB,, | Performed by: INTERNAL MEDICINE

## 2022-03-24 PROCEDURE — 99999 PR PBB SHADOW E&M-EST. PATIENT-LVL IV: ICD-10-PCS | Mod: PBBFAC,,, | Performed by: INTERNAL MEDICINE

## 2022-03-24 PROCEDURE — 90471 IMMUNIZATION ADMIN: CPT | Mod: S$GLB,,, | Performed by: INTERNAL MEDICINE

## 2022-03-24 PROCEDURE — 90471 PNEUMOCOCCAL POLYSACCHARIDE VACCINE 23-VALENT =>2YO SQ IM: ICD-10-PCS | Mod: S$GLB,,, | Performed by: INTERNAL MEDICINE

## 2022-03-24 PROCEDURE — 3074F SYST BP LT 130 MM HG: CPT | Mod: CPTII,S$GLB,, | Performed by: INTERNAL MEDICINE

## 2022-03-24 PROCEDURE — 94010 BREATHING CAPACITY TEST: ICD-10-PCS | Mod: S$GLB,,, | Performed by: INTERNAL MEDICINE

## 2022-03-24 PROCEDURE — 3079F DIAST BP 80-89 MM HG: CPT | Mod: CPTII,S$GLB,, | Performed by: INTERNAL MEDICINE

## 2022-03-24 PROCEDURE — 90732 PPSV23 VACC 2 YRS+ SUBQ/IM: CPT | Mod: S$GLB,,, | Performed by: INTERNAL MEDICINE

## 2022-03-24 PROCEDURE — 99204 PR OFFICE/OUTPT VISIT, NEW, LEVL IV, 45-59 MIN: ICD-10-PCS | Mod: 25,S$GLB,, | Performed by: INTERNAL MEDICINE

## 2022-03-24 PROCEDURE — 90732 PNEUMOCOCCAL POLYSACCHARIDE VACCINE 23-VALENT =>2YO SQ IM: ICD-10-PCS | Mod: S$GLB,,, | Performed by: INTERNAL MEDICINE

## 2022-03-24 PROCEDURE — 94729 DIFFUSING CAPACITY: CPT | Mod: S$GLB,,, | Performed by: INTERNAL MEDICINE

## 2022-03-24 PROCEDURE — 99204 OFFICE O/P NEW MOD 45 MIN: CPT | Mod: 25,S$GLB,, | Performed by: INTERNAL MEDICINE

## 2022-03-24 PROCEDURE — 99999 PR PBB SHADOW E&M-EST. PATIENT-LVL IV: CPT | Mod: PBBFAC,,, | Performed by: INTERNAL MEDICINE

## 2022-03-24 PROCEDURE — 3074F PR MOST RECENT SYSTOLIC BLOOD PRESSURE < 130 MM HG: ICD-10-PCS | Mod: CPTII,S$GLB,, | Performed by: INTERNAL MEDICINE

## 2022-03-24 PROCEDURE — 3008F BODY MASS INDEX DOCD: CPT | Mod: CPTII,S$GLB,, | Performed by: INTERNAL MEDICINE

## 2022-03-24 PROCEDURE — 1159F PR MEDICATION LIST DOCUMENTED IN MEDICAL RECORD: ICD-10-PCS | Mod: CPTII,S$GLB,, | Performed by: INTERNAL MEDICINE

## 2022-03-24 PROCEDURE — 1160F PR REVIEW ALL MEDS BY PRESCRIBER/CLIN PHARMACIST DOCUMENTED: ICD-10-PCS | Mod: CPTII,S$GLB,, | Performed by: INTERNAL MEDICINE

## 2022-03-24 PROCEDURE — 1160F RVW MEDS BY RX/DR IN RCRD: CPT | Mod: CPTII,S$GLB,, | Performed by: INTERNAL MEDICINE

## 2022-03-24 PROCEDURE — 94010 BREATHING CAPACITY TEST: CPT | Mod: S$GLB,,, | Performed by: INTERNAL MEDICINE

## 2022-03-24 PROCEDURE — 3008F PR BODY MASS INDEX (BMI) DOCUMENTED: ICD-10-PCS | Mod: CPTII,S$GLB,, | Performed by: INTERNAL MEDICINE

## 2022-03-24 PROCEDURE — 1159F MED LIST DOCD IN RCRD: CPT | Mod: CPTII,S$GLB,, | Performed by: INTERNAL MEDICINE

## 2022-03-24 PROCEDURE — 94729 PR C02/MEMBANE DIFFUSE CAPACITY: ICD-10-PCS | Mod: S$GLB,,, | Performed by: INTERNAL MEDICINE

## 2022-03-24 RX ORDER — ALBUTEROL SULFATE 90 UG/1
AEROSOL, METERED RESPIRATORY (INHALATION)
COMMUNITY
Start: 2022-03-21

## 2022-03-24 RX ORDER — BUDESONIDE AND FORMOTEROL FUMARATE DIHYDRATE 80; 4.5 UG/1; UG/1
AEROSOL RESPIRATORY (INHALATION)
COMMUNITY
Start: 2022-03-21

## 2022-03-24 NOTE — PROGRESS NOTES
"Subjective:       Patient ID: Ivan Rodriguez is a 47 y.o. male.    Chief Complaint: Pneumonia  47 y.o.  male pmh of NSCHL, stage IIB diagnosed in 1996; s/p 6 cycles ABVD followed by consolidative mediastinal XRT.  Had radiation induced esophagitis at time of radiation.     In January had a LLL pneumonia after a viral illness.  Still with chest tightness and sputum production.     Review of Systems   Constitutional: Positive for fatigue.        Sweats today   HENT: Negative for trouble swallowing.    Eyes: Negative for itching.   Respiratory: Positive for sputum production, chest tightness and dyspnea on extertion. Negative for hemoptysis and wheezing.    Cardiovascular: Negative for chest pain and leg swelling.   Genitourinary: Negative for difficulty urinating.   Endocrine: Negative for cold intolerance and heat intolerance.    Gastrointestinal: Negative for acid reflux.   Neurological: Negative for headaches.   Hematological: Negative for adenopathy.   Psychiatric/Behavioral: Negative for confusion.       Past medical and surgical history reviewed.  Social and family history reviewed.  Allergies and medications reviewed.  Previously diagnosed with pnemonia   Objective:       Vitals:    03/24/22 1525   BP: 128/84   BP Location: Left arm   Patient Position: Sitting   Pulse: 98   SpO2: 97%   Weight: 90.5 kg (199 lb 8.3 oz)   Height: 5' 9" (1.753 m)     Physical Exam   Constitutional: He is oriented to person, place, and time. He appears well-developed and well-nourished. No distress.   HENT:   Head: Normocephalic.   Right Ear: External ear normal.   Left Ear: External ear normal.   Nose: Nose normal.   Mouth/Throat: Oropharynx is clear and moist.   Neck: No tracheal deviation present. No thyromegaly present.   Cardiovascular: Normal rate, regular rhythm, normal heart sounds and intact distal pulses.   Pulmonary/Chest: Normal expansion and symmetric chest wall expansion. He has no wheezes. He has no rales. He " exhibits no tenderness.   Abdominal: Soft.   Musculoskeletal:         General: Normal range of motion.      Cervical back: Normal range of motion and neck supple.   Lymphadenopathy: No supraclavicular adenopathy is present.     He has no cervical adenopathy.   Neurological: He is alert and oriented to person, place, and time. No cranial nerve deficit.   Psychiatric: He has a normal mood and affect.   Vitals reviewed.       Personal Diagnostic Review  Pulmonary function tests: FEV1: 3.67  (94 % predicted), FVC:  4.49 (92 % predicted), FEV1/FVC:  82, DLCO:  (75 % predicted), Normal spirometry and mild decrease in diffusion.    Report of LLL infiltrate.    CXR 11/14/20 with RLL infiltrate that improved on subsequent CXR 12/10/2020    Records form outside physician personally reviewed.   No flowsheet data found.      Assessment:       1. Pneumonia of both lungs due to infectious organism, unspecified part of lung        Outpatient Encounter Medications as of 3/24/2022   Medication Sig Dispense Refill    albuterol (PROVENTIL/VENTOLIN HFA) 90 mcg/actuation inhaler       clomiPHENE (CLOMID) 50 mg tablet Take 1 tablet (50 mg total) by mouth every other day. 5 tablet 0    levothyroxine (SYNTHROID) 175 MCG tablet Take 1 tablet (175 mcg total) by mouth before breakfast. 90 tablet 3    SYMBICORT 80-4.5 mcg/actuation HFAA Inhale into the lungs.      tadalafiL (CIALIS) 5 MG tablet Take 1 tablet (5 mg total) by mouth daily as needed for Erectile Dysfunction (At least 30 min prior to sexual intercourse). 30 tablet 3    RABEprazole (ACIPHEX) 20 mg tablet Take 1 tablet (20 mg total) by mouth daily as needed. (Patient not taking: Reported on 12/10/2020) 90 tablet 3     Facility-Administered Encounter Medications as of 3/24/2022   Medication Dose Route Frequency Provider Last Rate Last Admin    pneumococcal vaccine (PNU-IMMUNE 23) injection 0.5 mL  0.5 mL Intramuscular 1 time in Clinic/HOD Kristyn Suazo MD         Orders Placed  This Encounter   Procedures    CT Chest Without Contrast     Standing Status:   Future     Standing Expiration Date:   3/24/2023     Order Specific Question:   May the Radiologist modify the order per protocol to meet the clinical needs of the patient?     Answer:   Yes    Pneumococcal polysaccharide vaccine 23-valent greater than or equal to 1yo subcutaneous/IM     Plan:     Problem List Items Addressed This Visit    None     Visit Diagnoses     Pneumonia of both lungs due to infectious organism, unspecified part of lung    -  Primary    Relevant Medications    pneumococcal vaccine (PNU-IMMUNE 23) injection 0.5 mL    Other Relevant Orders    CT Chest Without Contrast    Pneumococcal polysaccharide vaccine 23-valent greater than or equal to 1yo subcutaneous/IM (Completed)        Recurrent pneumonia in the setting of previously radiated mediastinum.  CT of chest to evaluate parenchymal abnormalities.    Counseled on importance of vaccination and airway clearance mechanisms including mucolytic (mucinex) as needed.     Post clinic clinic CT without significant parenchymal lung disease/bronchiectasis.  Biapical subpleural reticulation likely secondary to previous mediastinal radiation.  No significant coronary atherosclerosis.  Pneumonia is resolved.      I spent 60 minutes.  with the patient and more than half the time was spent discussing diagnosis and treatment.  I personally reviewed the patient's test results and medications past and present and the need to adhere to the plan put forth during this visit encounter as described above.

## 2022-03-25 ENCOUNTER — IMMUNIZATION (OUTPATIENT)
Dept: PHARMACY | Facility: CLINIC | Age: 47
End: 2022-03-25
Payer: COMMERCIAL

## 2022-03-25 DIAGNOSIS — Z23 NEED FOR VACCINATION: Primary | ICD-10-CM

## 2022-03-25 LAB
DLCO SINGLE BREATH LLN: 23.95
DLCO SINGLE BREATH PRE REF: 75.3 %
DLCO SINGLE BREATH REF: 30.87
DLCOC SBVA LLN: 3.21
DLCOC SBVA REF: 4.46
DLCOC SINGLE BREATH LLN: 23.95
DLCOC SINGLE BREATH REF: 30.87
DLCOCSBVAULN: 5.71
DLCOCSINGLEBREATHULN: 37.8
DLCOSINGLEBREATHULN: 37.8
DLCOVA LLN: 3.21
DLCOVA PRE REF: 93.1 %
DLCOVA PRE: 4.15 ML/(MIN*MMHG*L) (ref 3.21–5.71)
DLCOVA REF: 4.46
DLCOVAULN: 5.71
FEF 25 75 LLN: 2.03
FEF 25 75 PRE REF: 96.7 %
FEF 25 75 REF: 3.65
FEV05 LLN: 1.8
FEV05 REF: 2.94
FEV1 FVC LLN: 69
FEV1 FVC PRE REF: 102.3 %
FEV1 FVC REF: 80
FEV1 LLN: 3.06
FEV1 PRE REF: 94.2 %
FEV1 REF: 3.89
FVC LLN: 3.85
FVC PRE REF: 91.7 %
FVC REF: 4.9
IVC PRE: 4.34 L (ref 3.85–5.96)
IVC SINGLE BREATH LLN: 3.85
IVC SINGLE BREATH PRE REF: 88.5 %
IVC SINGLE BREATH REF: 4.9
IVCSINGLEBREATHULN: 5.96
PEF LLN: 7.48
PEF PRE REF: 89.9 %
PEF REF: 9.73
PHYSICIAN COMMENT: ABNORMAL
PRE DLCO: 23.23 ML/(MIN*MMHG) (ref 23.95–37.8)
PRE FEF 25 75: 3.53 L/S (ref 2.03–5.76)
PRE FET 100: 5.94 SEC
PRE FEV05 REF: 94.5 %
PRE FEV1 FVC: 81.58 % (ref 69.01–88.88)
PRE FEV1: 3.67 L (ref 3.06–4.69)
PRE FEV5: 2.78 L (ref 1.8–4.07)
PRE FVC: 4.49 L (ref 3.85–5.96)
PRE PEF: 8.76 L/S (ref 7.48–11.98)
VA PRE: 5.6 L (ref 6.77–6.77)
VA SINGLE BREATH PRE REF: 82.6 %
VA SINGLE BREATH REF: 6.77

## 2022-03-30 DIAGNOSIS — Z92.3 HISTORY OF MANTLE FIELD RADIATION THERAPY: ICD-10-CM

## 2022-03-30 DIAGNOSIS — Z92.21 HISTORY OF CHEMOTHERAPY: Primary | ICD-10-CM

## 2022-03-30 NOTE — PLAN OF CARE
Patient is a 47 year old man with a history of Hodgkin Lymphoma diagnosed at age 19 treated with ABVD and mediastinal radiation (4000 cGy).  He is beyond the age of our AYA Survivor program but would benefit from late effects monitoring.  Ordered cardiac calcium scan and echo and placed referral with Dr Ireland.

## 2022-03-31 ENCOUNTER — HOSPITAL ENCOUNTER (OUTPATIENT)
Dept: RADIOLOGY | Facility: OTHER | Age: 47
Discharge: HOME | End: 2022-03-31
Attending: INTERNAL MEDICINE
Payer: COMMERCIAL

## 2022-03-31 DIAGNOSIS — J18.9 PNEUMONIA OF BOTH LUNGS DUE TO INFECTIOUS ORGANISM, UNSPECIFIED PART OF LUNG: ICD-10-CM

## 2022-03-31 PROCEDURE — 71250 CT CHEST WITHOUT CONTRAST: ICD-10-PCS | Mod: 26,,, | Performed by: RADIOLOGY

## 2022-03-31 PROCEDURE — 71250 CT THORAX DX C-: CPT | Mod: 26,,, | Performed by: RADIOLOGY

## 2022-03-31 PROCEDURE — 71250 CT THORAX DX C-: CPT | Mod: TC

## 2022-04-05 ENCOUNTER — PATIENT MESSAGE (OUTPATIENT)
Dept: PULMONOLOGY | Facility: CLINIC | Age: 47
End: 2022-04-05
Payer: COMMERCIAL

## 2022-04-05 ENCOUNTER — TELEPHONE (OUTPATIENT)
Dept: PULMONOLOGY | Facility: HOSPITAL | Age: 47
End: 2022-04-05
Payer: COMMERCIAL

## 2022-04-18 ENCOUNTER — PATIENT MESSAGE (OUTPATIENT)
Dept: PULMONOLOGY | Facility: CLINIC | Age: 47
End: 2022-04-18
Payer: COMMERCIAL

## 2022-05-03 ENCOUNTER — PATIENT MESSAGE (OUTPATIENT)
Dept: ENDOCRINOLOGY | Facility: CLINIC | Age: 47
End: 2022-05-03
Payer: COMMERCIAL

## 2022-05-03 DIAGNOSIS — N52.8 OTHER MALE ERECTILE DYSFUNCTION: ICD-10-CM

## 2022-05-03 DIAGNOSIS — N52.9 ERECTILE DYSFUNCTION, UNSPECIFIED ERECTILE DYSFUNCTION TYPE: ICD-10-CM

## 2022-05-03 DIAGNOSIS — E29.1 MALE HYPOGONADISM: Primary | ICD-10-CM

## 2022-05-03 RX ORDER — TADALAFIL 5 MG/1
5 TABLET ORAL DAILY PRN
Qty: 30 TABLET | Refills: 3 | Status: SHIPPED | OUTPATIENT
Start: 2022-05-03 | End: 2022-05-04

## 2022-05-04 ENCOUNTER — PATIENT MESSAGE (OUTPATIENT)
Dept: ENDOCRINOLOGY | Facility: CLINIC | Age: 47
End: 2022-05-04
Payer: COMMERCIAL

## 2022-05-04 PROBLEM — N52.8 OTHER MALE ERECTILE DYSFUNCTION: Status: ACTIVE | Noted: 2022-05-04

## 2022-05-04 RX ORDER — SILDENAFIL 50 MG/1
50 TABLET, FILM COATED ORAL DAILY PRN
Qty: 30 TABLET | Refills: 3 | Status: SHIPPED | OUTPATIENT
Start: 2022-05-04 | End: 2023-05-04

## 2022-06-09 ENCOUNTER — PATIENT MESSAGE (OUTPATIENT)
Dept: ENDOCRINOLOGY | Facility: CLINIC | Age: 47
End: 2022-06-09
Payer: COMMERCIAL

## 2022-06-13 ENCOUNTER — LAB VISIT (OUTPATIENT)
Dept: LAB | Facility: HOSPITAL | Age: 47
End: 2022-06-13
Attending: INTERNAL MEDICINE
Payer: COMMERCIAL

## 2022-06-13 DIAGNOSIS — E29.1 MALE HYPOGONADISM: ICD-10-CM

## 2022-06-13 DIAGNOSIS — E03.9 ACQUIRED HYPOTHYROIDISM: ICD-10-CM

## 2022-06-13 LAB
ALBUMIN SERPL BCP-MCNC: 3.7 G/DL (ref 3.5–5.2)
ALP SERPL-CCNC: 69 U/L (ref 55–135)
ALT SERPL W/O P-5'-P-CCNC: 25 U/L (ref 10–44)
ANION GAP SERPL CALC-SCNC: 8 MMOL/L (ref 8–16)
AST SERPL-CCNC: 24 U/L (ref 10–40)
BASOPHILS # BLD AUTO: 0.03 K/UL (ref 0–0.2)
BASOPHILS NFR BLD: 0.6 % (ref 0–1.9)
BILIRUB SERPL-MCNC: 0.7 MG/DL (ref 0.1–1)
BUN SERPL-MCNC: 15 MG/DL (ref 6–20)
CALCIUM SERPL-MCNC: 8.8 MG/DL (ref 8.7–10.5)
CHLORIDE SERPL-SCNC: 106 MMOL/L (ref 95–110)
CHOLEST SERPL-MCNC: 127 MG/DL (ref 120–199)
CHOLEST/HDLC SERPL: 3 {RATIO} (ref 2–5)
CO2 SERPL-SCNC: 27 MMOL/L (ref 23–29)
CREAT SERPL-MCNC: 0.9 MG/DL (ref 0.5–1.4)
DIFFERENTIAL METHOD: ABNORMAL
EOSINOPHIL # BLD AUTO: 0.1 K/UL (ref 0–0.5)
EOSINOPHIL NFR BLD: 2.6 % (ref 0–8)
ERYTHROCYTE [DISTWIDTH] IN BLOOD BY AUTOMATED COUNT: 12.8 % (ref 11.5–14.5)
EST. GFR  (AFRICAN AMERICAN): >60 ML/MIN/1.73 M^2
EST. GFR  (NON AFRICAN AMERICAN): >60 ML/MIN/1.73 M^2
ESTIMATED AVG GLUCOSE: 114 MG/DL (ref 68–131)
FSH SERPL-ACNC: 9.19 MIU/ML (ref 0.95–11.95)
GLUCOSE SERPL-MCNC: 103 MG/DL (ref 70–110)
HBA1C MFR BLD: 5.6 % (ref 4–5.6)
HCT VFR BLD AUTO: 44.4 % (ref 40–54)
HDLC SERPL-MCNC: 43 MG/DL (ref 40–75)
HDLC SERPL: 33.9 % (ref 20–50)
HGB BLD-MCNC: 14.1 G/DL (ref 14–18)
IMM GRANULOCYTES # BLD AUTO: 0.01 K/UL (ref 0–0.04)
IMM GRANULOCYTES NFR BLD AUTO: 0.2 % (ref 0–0.5)
LDLC SERPL CALC-MCNC: 61.2 MG/DL (ref 63–159)
LH SERPL-ACNC: 4.5 MIU/ML (ref 0.6–12.1)
LYMPHOCYTES # BLD AUTO: 2 K/UL (ref 1–4.8)
LYMPHOCYTES NFR BLD: 37.7 % (ref 18–48)
MCH RBC QN AUTO: 29 PG (ref 27–31)
MCHC RBC AUTO-ENTMCNC: 31.8 G/DL (ref 32–36)
MCV RBC AUTO: 91 FL (ref 82–98)
MONOCYTES # BLD AUTO: 0.4 K/UL (ref 0.3–1)
MONOCYTES NFR BLD: 8.2 % (ref 4–15)
NEUTROPHILS # BLD AUTO: 2.7 K/UL (ref 1.8–7.7)
NEUTROPHILS NFR BLD: 50.7 % (ref 38–73)
NONHDLC SERPL-MCNC: 84 MG/DL
NRBC BLD-RTO: 0 /100 WBC
PLATELET # BLD AUTO: 234 K/UL (ref 150–450)
PMV BLD AUTO: 10.1 FL (ref 9.2–12.9)
POTASSIUM SERPL-SCNC: 4.4 MMOL/L (ref 3.5–5.1)
PROT SERPL-MCNC: 6.6 G/DL (ref 6–8.4)
RBC # BLD AUTO: 4.87 M/UL (ref 4.6–6.2)
SODIUM SERPL-SCNC: 141 MMOL/L (ref 136–145)
TRIGL SERPL-MCNC: 114 MG/DL (ref 30–150)
TSH SERPL DL<=0.005 MIU/L-ACNC: 1.71 UIU/ML (ref 0.4–4)
WBC # BLD AUTO: 5.39 K/UL (ref 3.9–12.7)

## 2022-06-13 PROCEDURE — 84443 ASSAY THYROID STIM HORMONE: CPT | Performed by: INTERNAL MEDICINE

## 2022-06-13 PROCEDURE — 85025 COMPLETE CBC W/AUTO DIFF WBC: CPT | Performed by: INTERNAL MEDICINE

## 2022-06-13 PROCEDURE — 82040 ASSAY OF SERUM ALBUMIN: CPT | Performed by: INTERNAL MEDICINE

## 2022-06-13 PROCEDURE — 80061 LIPID PANEL: CPT | Performed by: INTERNAL MEDICINE

## 2022-06-13 PROCEDURE — 83036 HEMOGLOBIN GLYCOSYLATED A1C: CPT | Performed by: INTERNAL MEDICINE

## 2022-06-13 PROCEDURE — 80053 COMPREHEN METABOLIC PANEL: CPT | Performed by: INTERNAL MEDICINE

## 2022-06-13 PROCEDURE — 83001 ASSAY OF GONADOTROPIN (FSH): CPT | Performed by: INTERNAL MEDICINE

## 2022-06-13 PROCEDURE — 83002 ASSAY OF GONADOTROPIN (LH): CPT | Performed by: INTERNAL MEDICINE

## 2022-06-14 ENCOUNTER — PATIENT MESSAGE (OUTPATIENT)
Dept: ENDOCRINOLOGY | Facility: CLINIC | Age: 47
End: 2022-06-14
Payer: COMMERCIAL

## 2022-06-18 LAB
ALBUMIN SERPL-MCNC: 4.2 G/DL (ref 3.6–5.1)
SHBG SERPL-SCNC: 27 NMOL/L (ref 10–50)
TESTOST FREE SERPL-MCNC: 107.5 PG/ML (ref 46–224)
TESTOST SERPL-MCNC: 630 NG/DL (ref 250–1100)
TESTOSTERONE.FREE+WB SERPL-MCNC: 207.1 NG/DL (ref 110–575)

## 2022-06-20 ENCOUNTER — PATIENT MESSAGE (OUTPATIENT)
Dept: ENDOCRINOLOGY | Facility: CLINIC | Age: 47
End: 2022-06-20
Payer: COMMERCIAL

## 2022-06-24 ENCOUNTER — TELEPHONE (OUTPATIENT)
Dept: PRIMARY CARE CLINIC | Facility: CLINIC | Age: 47
End: 2022-06-24
Payer: COMMERCIAL

## 2022-06-24 ENCOUNTER — PATIENT MESSAGE (OUTPATIENT)
Dept: CARDIOLOGY | Facility: HOSPITAL | Age: 47
End: 2022-06-24
Payer: COMMERCIAL

## 2022-06-24 NOTE — TELEPHONE ENCOUNTER
----- Message from Gregoria Lyons LPN sent at 6/23/2022  5:21 PM CDT -----  Regarding: appt  Call pt back @ 10AM Friday morning

## 2022-07-12 ENCOUNTER — HOSPITAL ENCOUNTER (OUTPATIENT)
Dept: RADIOLOGY | Facility: HOSPITAL | Age: 47
Discharge: HOME | End: 2022-07-12
Attending: PEDIATRICS
Payer: COMMERCIAL

## 2022-07-12 ENCOUNTER — HOSPITAL ENCOUNTER (OUTPATIENT)
Dept: CARDIOLOGY | Facility: HOSPITAL | Age: 47
Discharge: HOME | End: 2022-07-12
Attending: PEDIATRICS
Payer: COMMERCIAL

## 2022-07-12 VITALS
BODY MASS INDEX: 29.47 KG/M2 | HEIGHT: 69 IN | WEIGHT: 199 LBS | DIASTOLIC BLOOD PRESSURE: 80 MMHG | SYSTOLIC BLOOD PRESSURE: 125 MMHG | HEART RATE: 75 BPM

## 2022-07-12 DIAGNOSIS — Z92.21 HISTORY OF CHEMOTHERAPY: ICD-10-CM

## 2022-07-12 DIAGNOSIS — Z92.3 HISTORY OF MANTLE FIELD RADIATION THERAPY: ICD-10-CM

## 2022-07-12 LAB
ASCENDING AORTA: 2.84 CM
AV INDEX (PROSTH): 0.77
AV MEAN GRADIENT: 4 MMHG
AV PEAK GRADIENT: 8 MMHG
AV VALVE AREA: 3.21 CM2
AV VELOCITY RATIO: 0.76
BSA FOR ECHO PROCEDURE: 2.1 M2
CV ECHO LV RWT: 0.31 CM
DOP CALC AO PEAK VEL: 1.4 M/S
DOP CALC AO VTI: 21.86 CM
DOP CALC LVOT AREA: 4.2 CM2
DOP CALC LVOT DIAMETER: 2.3 CM
DOP CALC LVOT PEAK VEL: 1.07 M/S
DOP CALC LVOT STROKE VOLUME: 70.22 CM3
DOP CALCLVOT PEAK VEL VTI: 16.91 CM
E WAVE DECELERATION TIME: 147.75 MSEC
E/A RATIO: 1.01
E/E' RATIO: 8.2 M/S
ECHO LV POSTERIOR WALL: 0.81 CM (ref 0.6–1.1)
EJECTION FRACTION: 65 %
FRACTIONAL SHORTENING: 32 % (ref 28–44)
INTERVENTRICULAR SEPTUM: 0.83 CM (ref 0.6–1.1)
IVRT: 62.8 MSEC
LA MAJOR: 4.63 CM
LA MINOR: 5.11 CM
LA WIDTH: 3.79 CM
LEFT ATRIUM SIZE: 3.97 CM
LEFT ATRIUM VOLUME INDEX MOD: 20.5 ML/M2
LEFT ATRIUM VOLUME INDEX: 30.2 ML/M2
LEFT ATRIUM VOLUME MOD: 42.23 CM3
LEFT ATRIUM VOLUME: 62.13 CM3
LEFT INTERNAL DIMENSION IN SYSTOLE: 3.57 CM (ref 2.1–4)
LEFT VENTRICLE DIASTOLIC VOLUME INDEX: 63.59 ML/M2
LEFT VENTRICLE DIASTOLIC VOLUME: 131 ML
LEFT VENTRICLE MASS INDEX: 73 G/M2
LEFT VENTRICLE SYSTOLIC VOLUME INDEX: 25.8 ML/M2
LEFT VENTRICLE SYSTOLIC VOLUME: 53.21 ML
LEFT VENTRICULAR INTERNAL DIMENSION IN DIASTOLE: 5.23 CM (ref 3.5–6)
LEFT VENTRICULAR MASS: 151.35 G
LV LATERAL E/E' RATIO: 6.31 M/S
LV SEPTAL E/E' RATIO: 11.71 M/S
MV A" WAVE DURATION": 8.85 MSEC
MV PEAK A VEL: 0.81 M/S
MV PEAK E VEL: 0.82 M/S
MV STENOSIS PRESSURE HALF TIME: 42.85 MS
MV VALVE AREA P 1/2 METHOD: 5.13 CM2
PULM VEIN S/D RATIO: 1.05
PV PEAK D VEL: 0.56 M/S
PV PEAK S VEL: 0.59 M/S
QEF: 60 %
RA MAJOR: 4.63 CM
RA PRESSURE: 3 MMHG
RA WIDTH: 3.96 CM
RIGHT VENTRICULAR END-DIASTOLIC DIMENSION: 3.8 CM
RV TISSUE DOPPLER FREE WALL SYSTOLIC VELOCITY 1 (APICAL 4 CHAMBER VIEW): 14 CM/S
SINUS: 2.8 CM
STJ: 2.48 CM
TDI LATERAL: 0.13 M/S
TDI SEPTAL: 0.07 M/S
TDI: 0.1 M/S
TRICUSPID ANNULAR PLANE SYSTOLIC EXCURSION: 2.17 CM

## 2022-07-12 PROCEDURE — 93306 TTE W/DOPPLER COMPLETE: CPT | Mod: 26,,, | Performed by: INTERNAL MEDICINE

## 2022-07-12 PROCEDURE — 75571 CT HRT W/O DYE W/CA TEST: CPT | Mod: 26,,, | Performed by: RADIOLOGY

## 2022-07-12 PROCEDURE — 93356 PR IMG, MYOCARDIAL STRAIN, SPECKLE TRACKING: ICD-10-PCS | Mod: ,,, | Performed by: INTERNAL MEDICINE

## 2022-07-12 PROCEDURE — 93356 MYOCRD STRAIN IMG SPCKL TRCK: CPT | Mod: ,,, | Performed by: INTERNAL MEDICINE

## 2022-07-12 PROCEDURE — 93356 MYOCRD STRAIN IMG SPCKL TRCK: CPT

## 2022-07-12 PROCEDURE — 75571 CT CALCIUM SCORING CARDIAC: ICD-10-PCS | Mod: 26,,, | Performed by: RADIOLOGY

## 2022-07-12 PROCEDURE — 93306 ECHO (CUPID ONLY): ICD-10-PCS | Mod: 26,,, | Performed by: INTERNAL MEDICINE

## 2022-07-12 PROCEDURE — 75571 CT HRT W/O DYE W/CA TEST: CPT | Mod: TC

## 2022-11-21 ENCOUNTER — PATIENT MESSAGE (OUTPATIENT)
Dept: ENDOCRINOLOGY | Facility: CLINIC | Age: 47
End: 2022-11-21
Payer: COMMERCIAL

## 2022-11-21 DIAGNOSIS — E29.1 MALE HYPOGONADISM: ICD-10-CM

## 2022-11-21 DIAGNOSIS — E03.9 ACQUIRED HYPOTHYROIDISM: Primary | ICD-10-CM

## 2022-12-09 ENCOUNTER — PATIENT MESSAGE (OUTPATIENT)
Dept: ENDOCRINOLOGY | Facility: CLINIC | Age: 47
End: 2022-12-09
Payer: COMMERCIAL

## 2022-12-09 ENCOUNTER — LAB VISIT (OUTPATIENT)
Dept: LAB | Facility: HOSPITAL | Age: 47
End: 2022-12-09
Attending: INTERNAL MEDICINE
Payer: COMMERCIAL

## 2022-12-09 DIAGNOSIS — E03.9 ACQUIRED HYPOTHYROIDISM: ICD-10-CM

## 2022-12-09 DIAGNOSIS — E29.1 MALE HYPOGONADISM: ICD-10-CM

## 2022-12-09 DIAGNOSIS — E03.9 ACQUIRED HYPOTHYROIDISM: Primary | ICD-10-CM

## 2022-12-09 LAB
ALBUMIN SERPL BCP-MCNC: 4 G/DL (ref 3.5–5.2)
ALP SERPL-CCNC: 66 U/L (ref 55–135)
ALT SERPL W/O P-5'-P-CCNC: 31 U/L (ref 10–44)
ANION GAP SERPL CALC-SCNC: 9 MMOL/L (ref 8–16)
AST SERPL-CCNC: 33 U/L (ref 10–40)
BASOPHILS # BLD AUTO: 0.02 K/UL (ref 0–0.2)
BASOPHILS NFR BLD: 0.4 % (ref 0–1.9)
BILIRUB SERPL-MCNC: 1 MG/DL (ref 0.1–1)
BUN SERPL-MCNC: 23 MG/DL (ref 6–20)
CALCIUM SERPL-MCNC: 9.3 MG/DL (ref 8.7–10.5)
CHLORIDE SERPL-SCNC: 108 MMOL/L (ref 95–110)
CHOLEST SERPL-MCNC: 132 MG/DL (ref 120–199)
CHOLEST/HDLC SERPL: 3.5 {RATIO} (ref 2–5)
CO2 SERPL-SCNC: 24 MMOL/L (ref 23–29)
CREAT SERPL-MCNC: 1.3 MG/DL (ref 0.5–1.4)
DIFFERENTIAL METHOD: NORMAL
EOSINOPHIL # BLD AUTO: 0.1 K/UL (ref 0–0.5)
EOSINOPHIL NFR BLD: 1.6 % (ref 0–8)
ERYTHROCYTE [DISTWIDTH] IN BLOOD BY AUTOMATED COUNT: 12.9 % (ref 11.5–14.5)
EST. GFR  (NO RACE VARIABLE): >60 ML/MIN/1.73 M^2
ESTIMATED AVG GLUCOSE: 123 MG/DL (ref 68–131)
FSH SERPL-ACNC: 7.71 MIU/ML (ref 0.95–11.95)
GLUCOSE SERPL-MCNC: 102 MG/DL (ref 70–110)
HBA1C MFR BLD: 5.9 % (ref 4–5.6)
HCT VFR BLD AUTO: 44.1 % (ref 40–54)
HDLC SERPL-MCNC: 38 MG/DL (ref 40–75)
HDLC SERPL: 28.8 % (ref 20–50)
HGB BLD-MCNC: 14.4 G/DL (ref 14–18)
IMM GRANULOCYTES # BLD AUTO: 0.01 K/UL (ref 0–0.04)
IMM GRANULOCYTES NFR BLD AUTO: 0.2 % (ref 0–0.5)
LDLC SERPL CALC-MCNC: 72.8 MG/DL (ref 63–159)
LH SERPL-ACNC: 1.7 MIU/ML (ref 0.6–12.1)
LYMPHOCYTES # BLD AUTO: 1.9 K/UL (ref 1–4.8)
LYMPHOCYTES NFR BLD: 37.3 % (ref 18–48)
MCH RBC QN AUTO: 28.9 PG (ref 27–31)
MCHC RBC AUTO-ENTMCNC: 32.7 G/DL (ref 32–36)
MCV RBC AUTO: 88 FL (ref 82–98)
MONOCYTES # BLD AUTO: 0.4 K/UL (ref 0.3–1)
MONOCYTES NFR BLD: 7.8 % (ref 4–15)
NEUTROPHILS # BLD AUTO: 2.7 K/UL (ref 1.8–7.7)
NEUTROPHILS NFR BLD: 52.7 % (ref 38–73)
NONHDLC SERPL-MCNC: 94 MG/DL
NRBC BLD-RTO: 0 /100 WBC
PLATELET # BLD AUTO: 235 K/UL (ref 150–450)
PMV BLD AUTO: 9.4 FL (ref 9.2–12.9)
POTASSIUM SERPL-SCNC: 4.4 MMOL/L (ref 3.5–5.1)
PROT SERPL-MCNC: 7 G/DL (ref 6–8.4)
RBC # BLD AUTO: 4.99 M/UL (ref 4.6–6.2)
SODIUM SERPL-SCNC: 141 MMOL/L (ref 136–145)
T4 FREE SERPL-MCNC: 0.91 NG/DL (ref 0.71–1.51)
TRIGL SERPL-MCNC: 106 MG/DL (ref 30–150)
TSH SERPL DL<=0.005 MIU/L-ACNC: 5.5 UIU/ML (ref 0.4–4)
WBC # BLD AUTO: 5.02 K/UL (ref 3.9–12.7)

## 2022-12-09 PROCEDURE — 82040 ASSAY OF SERUM ALBUMIN: CPT | Performed by: INTERNAL MEDICINE

## 2022-12-09 PROCEDURE — 84443 ASSAY THYROID STIM HORMONE: CPT | Performed by: INTERNAL MEDICINE

## 2022-12-09 PROCEDURE — 83001 ASSAY OF GONADOTROPIN (FSH): CPT | Performed by: INTERNAL MEDICINE

## 2022-12-09 PROCEDURE — 80053 COMPREHEN METABOLIC PANEL: CPT | Performed by: INTERNAL MEDICINE

## 2022-12-09 PROCEDURE — 83002 ASSAY OF GONADOTROPIN (LH): CPT | Performed by: INTERNAL MEDICINE

## 2022-12-09 PROCEDURE — 80061 LIPID PANEL: CPT | Performed by: INTERNAL MEDICINE

## 2022-12-09 PROCEDURE — 84439 ASSAY OF FREE THYROXINE: CPT | Performed by: INTERNAL MEDICINE

## 2022-12-09 PROCEDURE — 36415 COLL VENOUS BLD VENIPUNCTURE: CPT | Performed by: INTERNAL MEDICINE

## 2022-12-09 PROCEDURE — 85025 COMPLETE CBC W/AUTO DIFF WBC: CPT | Performed by: INTERNAL MEDICINE

## 2022-12-09 PROCEDURE — 83036 HEMOGLOBIN GLYCOSYLATED A1C: CPT | Performed by: INTERNAL MEDICINE

## 2022-12-12 ENCOUNTER — PATIENT MESSAGE (OUTPATIENT)
Dept: ENDOCRINOLOGY | Facility: CLINIC | Age: 47
End: 2022-12-12
Payer: COMMERCIAL

## 2022-12-15 LAB
ALBUMIN SERPL-MCNC: 4.3 G/DL (ref 3.6–5.1)
SHBG SERPL-SCNC: 21 NMOL/L (ref 10–50)
TESTOST FREE SERPL-MCNC: 91.5 PG/ML (ref 46–224)
TESTOST SERPL-MCNC: 472 NG/DL (ref 250–1100)
TESTOSTERONE.FREE+WB SERPL-MCNC: 180.2 NG/DL (ref 110–575)

## 2023-01-03 ENCOUNTER — PATIENT MESSAGE (OUTPATIENT)
Dept: ENDOCRINOLOGY | Facility: CLINIC | Age: 48
End: 2023-01-03
Payer: COMMERCIAL

## 2023-01-05 ENCOUNTER — PATIENT MESSAGE (OUTPATIENT)
Dept: PULMONOLOGY | Facility: CLINIC | Age: 48
End: 2023-01-05
Payer: COMMERCIAL

## 2023-02-13 ENCOUNTER — PATIENT MESSAGE (OUTPATIENT)
Dept: ENDOCRINOLOGY | Facility: CLINIC | Age: 48
End: 2023-02-13
Payer: COMMERCIAL

## 2023-02-13 DIAGNOSIS — E29.1 MALE HYPOGONADISM: ICD-10-CM

## 2023-02-13 DIAGNOSIS — E03.9 ACQUIRED HYPOTHYROIDISM: Primary | ICD-10-CM

## 2023-03-07 ENCOUNTER — PATIENT MESSAGE (OUTPATIENT)
Dept: ENDOCRINOLOGY | Facility: CLINIC | Age: 48
End: 2023-03-07
Payer: COMMERCIAL

## 2023-03-08 ENCOUNTER — HOSPITAL ENCOUNTER (EMERGENCY)
Facility: HOSPITAL | Age: 48
Discharge: HOME | End: 2023-03-08
Attending: EMERGENCY MEDICINE
Payer: COMMERCIAL

## 2023-03-08 VITALS
SYSTOLIC BLOOD PRESSURE: 131 MMHG | BODY MASS INDEX: 29.39 KG/M2 | OXYGEN SATURATION: 95 % | WEIGHT: 199 LBS | HEART RATE: 88 BPM | RESPIRATION RATE: 20 BRPM | DIASTOLIC BLOOD PRESSURE: 85 MMHG | TEMPERATURE: 98 F

## 2023-03-08 DIAGNOSIS — U09.9 POST-COVID SYNDROME: Primary | ICD-10-CM

## 2023-03-08 DIAGNOSIS — R07.9 CHEST PAIN: ICD-10-CM

## 2023-03-08 LAB
ALBUMIN SERPL BCP-MCNC: 4 G/DL (ref 3.5–5.2)
ALP SERPL-CCNC: 57 U/L (ref 55–135)
ALT SERPL W/O P-5'-P-CCNC: 30 U/L (ref 10–44)
ANION GAP SERPL CALC-SCNC: 10 MMOL/L (ref 8–16)
AST SERPL-CCNC: 27 U/L (ref 10–40)
BASOPHILS # BLD AUTO: 0.04 K/UL (ref 0–0.2)
BASOPHILS NFR BLD: 0.5 % (ref 0–1.9)
BILIRUB SERPL-MCNC: 0.5 MG/DL (ref 0.1–1)
BNP SERPL-MCNC: <10 PG/ML (ref 0–99)
BUN SERPL-MCNC: 20 MG/DL (ref 6–20)
CALCIUM SERPL-MCNC: 9.1 MG/DL (ref 8.7–10.5)
CHLORIDE SERPL-SCNC: 104 MMOL/L (ref 95–110)
CO2 SERPL-SCNC: 26 MMOL/L (ref 23–29)
CREAT SERPL-MCNC: 1.1 MG/DL (ref 0.5–1.4)
D DIMER PPP IA.FEU-MCNC: <0.19 MG/L FEU
DIFFERENTIAL METHOD: ABNORMAL
EOSINOPHIL # BLD AUTO: 0.1 K/UL (ref 0–0.5)
EOSINOPHIL NFR BLD: 1.3 % (ref 0–8)
ERYTHROCYTE [DISTWIDTH] IN BLOOD BY AUTOMATED COUNT: 13.3 % (ref 11.5–14.5)
EST. GFR  (NO RACE VARIABLE): >60 ML/MIN/1.73 M^2
GLUCOSE SERPL-MCNC: 98 MG/DL (ref 70–110)
HCT VFR BLD AUTO: 43.9 % (ref 40–54)
HCV AB SERPL QL IA: NORMAL
HGB BLD-MCNC: 14.3 G/DL (ref 14–18)
HIV 1+2 AB+HIV1 P24 AG SERPL QL IA: NORMAL
IMM GRANULOCYTES # BLD AUTO: 0.05 K/UL (ref 0–0.04)
IMM GRANULOCYTES NFR BLD AUTO: 0.7 % (ref 0–0.5)
LYMPHOCYTES # BLD AUTO: 2.6 K/UL (ref 1–4.8)
LYMPHOCYTES NFR BLD: 34 % (ref 18–48)
MCH RBC QN AUTO: 27.8 PG (ref 27–31)
MCHC RBC AUTO-ENTMCNC: 32.6 G/DL (ref 32–36)
MCV RBC AUTO: 85 FL (ref 82–98)
MONOCYTES # BLD AUTO: 0.5 K/UL (ref 0.3–1)
MONOCYTES NFR BLD: 7 % (ref 4–15)
NEUTROPHILS # BLD AUTO: 4.3 K/UL (ref 1.8–7.7)
NEUTROPHILS NFR BLD: 56.5 % (ref 38–73)
NRBC BLD-RTO: 0 /100 WBC
PLATELET # BLD AUTO: 250 K/UL (ref 150–450)
PMV BLD AUTO: 9.2 FL (ref 9.2–12.9)
POTASSIUM SERPL-SCNC: 3.8 MMOL/L (ref 3.5–5.1)
PROT SERPL-MCNC: 6.9 G/DL (ref 6–8.4)
RBC # BLD AUTO: 5.14 M/UL (ref 4.6–6.2)
SODIUM SERPL-SCNC: 140 MMOL/L (ref 136–145)
TROPONIN I SERPL DL<=0.01 NG/ML-MCNC: <0.006 NG/ML (ref 0–0.03)
TROPONIN I SERPL DL<=0.01 NG/ML-MCNC: <0.006 NG/ML (ref 0–0.03)
WBC # BLD AUTO: 7.67 K/UL (ref 3.9–12.7)

## 2023-03-08 PROCEDURE — 25000003 PHARM REV CODE 250: Performed by: EMERGENCY MEDICINE

## 2023-03-08 PROCEDURE — 99285 EMERGENCY DEPT VISIT HI MDM: CPT | Mod: 25

## 2023-03-08 PROCEDURE — 99284 EMERGENCY DEPT VISIT MOD MDM: CPT | Mod: ,,, | Performed by: EMERGENCY MEDICINE

## 2023-03-08 PROCEDURE — 87389 HIV-1 AG W/HIV-1&-2 AB AG IA: CPT | Performed by: PHYSICIAN ASSISTANT

## 2023-03-08 PROCEDURE — 80053 COMPREHEN METABOLIC PANEL: CPT | Performed by: EMERGENCY MEDICINE

## 2023-03-08 PROCEDURE — 63600175 PHARM REV CODE 636 W HCPCS: Performed by: EMERGENCY MEDICINE

## 2023-03-08 PROCEDURE — 93010 EKG 12-LEAD: ICD-10-PCS | Mod: ,,, | Performed by: INTERNAL MEDICINE

## 2023-03-08 PROCEDURE — 85025 COMPLETE CBC W/AUTO DIFF WBC: CPT | Performed by: EMERGENCY MEDICINE

## 2023-03-08 PROCEDURE — 83880 ASSAY OF NATRIURETIC PEPTIDE: CPT | Performed by: EMERGENCY MEDICINE

## 2023-03-08 PROCEDURE — 99284 PR EMERGENCY DEPT VISIT,LEVEL IV: ICD-10-PCS | Mod: ,,, | Performed by: EMERGENCY MEDICINE

## 2023-03-08 PROCEDURE — 93005 ELECTROCARDIOGRAM TRACING: CPT

## 2023-03-08 PROCEDURE — 93010 ELECTROCARDIOGRAM REPORT: CPT | Mod: ,,, | Performed by: INTERNAL MEDICINE

## 2023-03-08 PROCEDURE — 86803 HEPATITIS C AB TEST: CPT | Performed by: PHYSICIAN ASSISTANT

## 2023-03-08 PROCEDURE — 84484 ASSAY OF TROPONIN QUANT: CPT | Performed by: EMERGENCY MEDICINE

## 2023-03-08 PROCEDURE — 85379 FIBRIN DEGRADATION QUANT: CPT | Performed by: EMERGENCY MEDICINE

## 2023-03-08 RX ORDER — ASPIRIN 325 MG
325 TABLET ORAL
Status: COMPLETED | OUTPATIENT
Start: 2023-03-08 | End: 2023-03-08

## 2023-03-08 RX ADMIN — ASPIRIN 325 MG ORAL TABLET 325 MG: 325 PILL ORAL at 04:03

## 2023-03-08 RX ADMIN — SODIUM CHLORIDE, POTASSIUM CHLORIDE, SODIUM LACTATE AND CALCIUM CHLORIDE 1000 ML: 600; 310; 30; 20 INJECTION, SOLUTION INTRAVENOUS at 04:03

## 2023-03-08 NOTE — ED TRIAGE NOTES
"CC 3/10 upper sternal pain and left chest and arm pain. "Poking fingernail" pain. States he had COVID a month ago and has had cough ever since.   "

## 2023-03-08 NOTE — ED PROVIDER NOTES
Encounter Date: 3/8/2023       History     Source of history: Patient.  History obtained without limitations.    HPI:  The patient is a 48-year-old with the below past medical history.  He was diagnosed with COVID-19 infection one month ago.  Those symptoms improved but he continues to have episodes of heavy cough and chest congestion.  He complains of episodes of diaphoresis since recovering from COVID. His heart rate has also been elevated from baseline which he states is 80 bpm. This afternoon, he had an episode of diaphoresis and heart rate elevation to 110 bpm. He also developed mild discomfort to his left medial chest that feels like he is being poked. He has associated discomfort to his left arm in the elbow region. He denies shortness of breath, nausea, and vomiting. He does not smoke. He does not have a history of coronary disease but received radiation for Hodgkin's lymphoma in the 1990s.       Review of patient's allergies indicates:  No Known Allergies  Past Medical History:   Diagnosis Date    Hodgkin lymphoma     Hypothyroidism     Prediabetes     Thyroid disease      Past Surgical History:   Procedure Laterality Date    COLONOSCOPY N/A 9/29/2021    Procedure: COLONOSCOPY;  Surgeon: Mohan Rodriguez MD;  Location: [address]);  Service: Endoscopy;  Laterality: N/A;  PM prep    ESOPHAGOGASTRODUODENOSCOPY N/A 9/29/2021    Procedure: EGD (ESOPHAGOGASTRODUODENOSCOPY);  Surgeon: Mohan Rodriguez MD;  Location: [address]);  Service: Endoscopy;  Laterality: N/A;  Rapid COVID    HERNIA MESH REMOVAL  09/2019    HERNIA REPAIR      LYMPH NODE BIOPSY Right 2003    [facility]     Family History   Problem Relation Age of Onset    Kidney disease Father     Colon cancer Neg Hx     Esophageal cancer Neg Hx      Social History     Tobacco Use    Smoking status: Never    Smokeless tobacco: Never   Substance Use Topics    Alcohol use: No     Alcohol/week: 0.0 standard drinks    Drug use: Never     Review of  Systems  See HPI.    Physical Exam     Initial Vitals [03/08/23 1512]   BP Pulse Resp Temp SpO2   (!) 184/115 105 18 98.6 °F (37 °C) 97 %      MAP       --         Physical Exam    Nursing note and vitals reviewed.  Constitutional: He appears well-developed and well-nourished. He is not diaphoretic. No distress.   HENT:   Head: Normocephalic and atraumatic.   Mouth/Throat: Oropharynx is clear and moist.   Eyes: Conjunctivae are normal. No scleral icterus.   Neck: No JVD present.   Cardiovascular:  Normal rate, regular rhythm and normal heart sounds.     Exam reveals no gallop and no friction rub.       No murmur heard.  Pulmonary/Chest: Effort normal and breath sounds normal. No stridor. No respiratory distress. He has no decreased breath sounds. He has no wheezes. He has no rhonchi. He has no rales.   Abdominal: Abdomen is soft. He exhibits no distension. There is no abdominal tenderness.   Musculoskeletal:         General: No edema.     Neurological: He is alert and oriented to person, place, and time. GCS score is 15. GCS eye subscore is 4. GCS verbal subscore is 5. GCS motor subscore is 6.   Skin: Skin is warm and dry. No pallor.       ED Course   Procedures  Labs Reviewed   CBC W/ AUTO DIFFERENTIAL - Abnormal; Notable for the following components:       Result Value    Immature Granulocytes 0.7 (*)     Immature Grans (Abs) 0.05 (*)     All other components within normal limits   HIV 1 / 2 ANTIBODY    Narrative:     Release to patient->Immediate   HEPATITIS C ANTIBODY    Narrative:     Release to patient->Immediate   COMPREHENSIVE METABOLIC PANEL   TROPONIN I   TROPONIN I   B-TYPE NATRIURETIC PEPTIDE   D DIMER, QUANTITATIVE     EKG Readings: (Independently Interpreted)   Time of study: 03/08/2023 15:12   Normal sinus rhythm.  Ventricular rate 100 beats per minute.  Normal axis.  Normal QRS interval.  Prolonged QTc interval (459 ms). No ST elevation or depression. Inferior and anterolateral T wave flattening and  inversion.       Imaging Results              X-Ray Chest AP Portable (Final result)  Result time 03/08/23 17:41:17      Final result by Cooper Dotson MD (03/08/23 17:41:17)                   Impression:      No detrimental change or radiographic acute intrathoracic process seen.      Electronically signed by: Cooper Dotson MD  Date:    03/08/2023  Time:    17:41               Narrative:    EXAMINATION:  XR CHEST AP PORTABLE    CLINICAL HISTORY:  Chest Pain;    TECHNIQUE:  Single frontal view of the chest was performed.    COMPARISON:  Chest radiograph 12/10/2020 and CT thorax 03/31/2022    FINDINGS:  Monitoring leads overlie the chest.  No detrimental change.  Trachea is relatively midline and patent.The lungs are well expanded and clear, with normal appearance of pulmonary vasculature and no pleural effusion or pneumothorax.    The cardiac silhouette is normal in size. The hilar and mediastinal contours are unremarkable.    Bones are intact.                                       Medications   aspirin tablet 325 mg (325 mg Oral Given 3/8/23 1619)   lactated ringers bolus 1,000 mL (1,000 mLs Intravenous New Bag 3/8/23 1631)     Medical Decision Making:   Independently Interpreted Test(s):   I have ordered and independently interpreted EKG Reading(s) - see prior notes  Clinical Tests:   Lab Tests: Reviewed  Radiological Study: Reviewed  Medical Tests: Reviewed    MDM:   This was an urgent evaluation.  EKG was negative for arrhythmia and acute ischemic changes.  Chest x-ray is negative for acute processes.  Troponin and BNP were normal.  Serial troponins were not indicated given the patient's risk profile.  Presentation and workup were most consistent with post COVID syndrome.  Arrhythmia, pneumonia, spontaneous pneumothorax, acute coronary syndromes, and other conditions were considered but felt to be much less likely.  PCP follow-up was advised.  Standard ED return instructions were given.                         Clinical Impression:   Final diagnoses:  [R07.9] Chest pain  [U09.9] Post-COVID syndrome (Primary)        ED Disposition Condition    Discharge Stable          ED Prescriptions    None       Follow-up Information       Follow up With Specialties Details Why Contact Info    Jenifer Laird MD Internal Medicine  Schedule a close ER follow-up visit. [address] [place] [address]  [phone]      ER   Return to the ER for worsened symptoms or for any other concerns.              Héctor Carmona III, MD  03/09/23 8536

## 2023-03-09 ENCOUNTER — LAB VISIT (OUTPATIENT)
Dept: LAB | Facility: HOSPITAL | Age: 48
End: 2023-03-09
Attending: INTERNAL MEDICINE
Payer: COMMERCIAL

## 2023-03-09 DIAGNOSIS — E03.9 ACQUIRED HYPOTHYROIDISM: ICD-10-CM

## 2023-03-09 DIAGNOSIS — E29.1 MALE HYPOGONADISM: ICD-10-CM

## 2023-03-09 LAB
ALBUMIN SERPL BCP-MCNC: 3.9 G/DL (ref 3.5–5.2)
ALP SERPL-CCNC: 54 U/L (ref 55–135)
ALT SERPL W/O P-5'-P-CCNC: 29 U/L (ref 10–44)
ANION GAP SERPL CALC-SCNC: 5 MMOL/L (ref 8–16)
AST SERPL-CCNC: 24 U/L (ref 10–40)
BASOPHILS # BLD AUTO: 0.03 K/UL (ref 0–0.2)
BASOPHILS NFR BLD: 0.5 % (ref 0–1.9)
BILIRUB SERPL-MCNC: 0.9 MG/DL (ref 0.1–1)
BUN SERPL-MCNC: 15 MG/DL (ref 6–20)
CALCIUM SERPL-MCNC: 9.1 MG/DL (ref 8.7–10.5)
CHLORIDE SERPL-SCNC: 108 MMOL/L (ref 95–110)
CHOLEST SERPL-MCNC: 145 MG/DL (ref 120–199)
CHOLEST/HDLC SERPL: 3.5 {RATIO} (ref 2–5)
CO2 SERPL-SCNC: 30 MMOL/L (ref 23–29)
CREAT SERPL-MCNC: 0.9 MG/DL (ref 0.5–1.4)
DIFFERENTIAL METHOD: ABNORMAL
EOSINOPHIL # BLD AUTO: 0.1 K/UL (ref 0–0.5)
EOSINOPHIL NFR BLD: 1.7 % (ref 0–8)
ERYTHROCYTE [DISTWIDTH] IN BLOOD BY AUTOMATED COUNT: 13.5 % (ref 11.5–14.5)
EST. GFR  (NO RACE VARIABLE): >60 ML/MIN/1.73 M^2
ESTIMATED AVG GLUCOSE: 131 MG/DL (ref 68–131)
FSH SERPL-ACNC: 7.31 MIU/ML (ref 0.95–11.95)
GLUCOSE SERPL-MCNC: 112 MG/DL (ref 70–110)
HBA1C MFR BLD: 6.2 % (ref 4–5.6)
HCT VFR BLD AUTO: 45.3 % (ref 40–54)
HDLC SERPL-MCNC: 41 MG/DL (ref 40–75)
HDLC SERPL: 28.3 % (ref 20–50)
HGB BLD-MCNC: 14.2 G/DL (ref 14–18)
IMM GRANULOCYTES # BLD AUTO: 0.03 K/UL (ref 0–0.04)
IMM GRANULOCYTES NFR BLD AUTO: 0.5 % (ref 0–0.5)
LDLC SERPL CALC-MCNC: 74.2 MG/DL (ref 63–159)
LH SERPL-ACNC: 1.5 MIU/ML (ref 0.6–12.1)
LYMPHOCYTES # BLD AUTO: 2 K/UL (ref 1–4.8)
LYMPHOCYTES NFR BLD: 34.2 % (ref 18–48)
MCH RBC QN AUTO: 27.6 PG (ref 27–31)
MCHC RBC AUTO-ENTMCNC: 31.3 G/DL (ref 32–36)
MCV RBC AUTO: 88 FL (ref 82–98)
MONOCYTES # BLD AUTO: 0.5 K/UL (ref 0.3–1)
MONOCYTES NFR BLD: 9 % (ref 4–15)
NEUTROPHILS # BLD AUTO: 3.2 K/UL (ref 1.8–7.7)
NEUTROPHILS NFR BLD: 54.1 % (ref 38–73)
NONHDLC SERPL-MCNC: 104 MG/DL
NRBC BLD-RTO: 0 /100 WBC
PLATELET # BLD AUTO: 228 K/UL (ref 150–450)
PMV BLD AUTO: 9 FL (ref 9.2–12.9)
POTASSIUM SERPL-SCNC: 4.4 MMOL/L (ref 3.5–5.1)
PROT SERPL-MCNC: 6.7 G/DL (ref 6–8.4)
RBC # BLD AUTO: 5.15 M/UL (ref 4.6–6.2)
SODIUM SERPL-SCNC: 143 MMOL/L (ref 136–145)
TRIGL SERPL-MCNC: 149 MG/DL (ref 30–150)
TSH SERPL DL<=0.005 MIU/L-ACNC: 2.8 UIU/ML (ref 0.4–4)
WBC # BLD AUTO: 5.91 K/UL (ref 3.9–12.7)

## 2023-03-09 PROCEDURE — 85025 COMPLETE CBC W/AUTO DIFF WBC: CPT | Performed by: INTERNAL MEDICINE

## 2023-03-09 PROCEDURE — 80053 COMPREHEN METABOLIC PANEL: CPT | Performed by: INTERNAL MEDICINE

## 2023-03-09 PROCEDURE — 84443 ASSAY THYROID STIM HORMONE: CPT | Performed by: INTERNAL MEDICINE

## 2023-03-09 PROCEDURE — 84403 ASSAY OF TOTAL TESTOSTERONE: CPT | Performed by: INTERNAL MEDICINE

## 2023-03-09 PROCEDURE — 83036 HEMOGLOBIN GLYCOSYLATED A1C: CPT | Performed by: INTERNAL MEDICINE

## 2023-03-09 PROCEDURE — 83002 ASSAY OF GONADOTROPIN (LH): CPT | Performed by: INTERNAL MEDICINE

## 2023-03-09 PROCEDURE — 84270 ASSAY OF SEX HORMONE GLOBUL: CPT | Performed by: INTERNAL MEDICINE

## 2023-03-09 PROCEDURE — 80061 LIPID PANEL: CPT | Performed by: INTERNAL MEDICINE

## 2023-03-09 PROCEDURE — 36415 COLL VENOUS BLD VENIPUNCTURE: CPT | Performed by: INTERNAL MEDICINE

## 2023-03-09 PROCEDURE — 83001 ASSAY OF GONADOTROPIN (FSH): CPT | Performed by: INTERNAL MEDICINE

## 2023-03-15 LAB
ALBUMIN SERPL-MCNC: 4.5 G/DL (ref 3.6–5.1)
SHBG SERPL-SCNC: 15 NMOL/L (ref 10–50)
TESTOST FREE SERPL-MCNC: 68.8 PG/ML (ref 46–224)
TESTOST SERPL-MCNC: 308 NG/DL (ref 250–1100)
TESTOSTERONE.FREE+WB SERPL-MCNC: 141.4 NG/DL (ref 110–575)

## 2023-03-17 ENCOUNTER — TELEPHONE (OUTPATIENT)
Dept: ENDOCRINOLOGY | Facility: CLINIC | Age: 48
End: 2023-03-17
Payer: COMMERCIAL

## 2023-04-06 ENCOUNTER — PATIENT MESSAGE (OUTPATIENT)
Dept: ENDOCRINOLOGY | Facility: CLINIC | Age: 48
End: 2023-04-06
Payer: COMMERCIAL

## 2023-04-10 ENCOUNTER — PATIENT MESSAGE (OUTPATIENT)
Dept: ENDOCRINOLOGY | Facility: CLINIC | Age: 48
End: 2023-04-10
Payer: COMMERCIAL

## 2023-04-10 DIAGNOSIS — E29.1 MALE HYPOGONADISM: Primary | ICD-10-CM

## 2023-04-11 DIAGNOSIS — E29.1 MALE HYPOGONADISM: Primary | ICD-10-CM

## 2023-08-08 ENCOUNTER — PATIENT MESSAGE (OUTPATIENT)
Dept: ENDOCRINOLOGY | Facility: CLINIC | Age: 48
End: 2023-08-08
Payer: COMMERCIAL

## 2023-08-08 DIAGNOSIS — E29.1 MALE HYPOGONADISM: ICD-10-CM

## 2023-08-08 DIAGNOSIS — G47.33 OBSTRUCTIVE SLEEP APNEA: ICD-10-CM

## 2023-08-08 DIAGNOSIS — E03.9 ACQUIRED HYPOTHYROIDISM: Primary | ICD-10-CM

## 2023-08-11 ENCOUNTER — TELEPHONE (OUTPATIENT)
Dept: ENDOCRINOLOGY | Facility: CLINIC | Age: 48
End: 2023-08-11
Payer: COMMERCIAL

## 2023-08-11 NOTE — TELEPHONE ENCOUNTER
Called patient to inform him that I had an opening appointment with Dr. Schafer on Monday . Patient declined it and he I was trying to schedule his labs he also denied that too stating that he would like to lose a little wait until he come in

## 2023-09-06 ENCOUNTER — LAB VISIT (OUTPATIENT)
Dept: LAB | Facility: HOSPITAL | Age: 48
End: 2023-09-06
Attending: INTERNAL MEDICINE
Payer: COMMERCIAL

## 2023-09-06 DIAGNOSIS — E29.1 MALE HYPOGONADISM: ICD-10-CM

## 2023-09-06 DIAGNOSIS — E03.9 ACQUIRED HYPOTHYROIDISM: ICD-10-CM

## 2023-09-06 LAB
ALBUMIN SERPL BCP-MCNC: 3.8 G/DL (ref 3.5–5.2)
ALP SERPL-CCNC: 55 U/L (ref 55–135)
ALT SERPL W/O P-5'-P-CCNC: 32 U/L (ref 10–44)
ANION GAP SERPL CALC-SCNC: 8 MMOL/L (ref 8–16)
AST SERPL-CCNC: 32 U/L (ref 10–40)
BASOPHILS # BLD AUTO: 0.04 K/UL (ref 0–0.2)
BASOPHILS NFR BLD: 0.7 % (ref 0–1.9)
BILIRUB SERPL-MCNC: 0.7 MG/DL (ref 0.1–1)
BUN SERPL-MCNC: 18 MG/DL (ref 6–20)
CALCIUM SERPL-MCNC: 8.6 MG/DL (ref 8.7–10.5)
CHLORIDE SERPL-SCNC: 108 MMOL/L (ref 95–110)
CHOLEST SERPL-MCNC: 137 MG/DL (ref 120–199)
CHOLEST/HDLC SERPL: 3.4 {RATIO} (ref 2–5)
CO2 SERPL-SCNC: 26 MMOL/L (ref 23–29)
CREAT SERPL-MCNC: 1 MG/DL (ref 0.5–1.4)
DIFFERENTIAL METHOD: ABNORMAL
EOSINOPHIL # BLD AUTO: 0.2 K/UL (ref 0–0.5)
EOSINOPHIL NFR BLD: 3.4 % (ref 0–8)
ERYTHROCYTE [DISTWIDTH] IN BLOOD BY AUTOMATED COUNT: 13.3 % (ref 11.5–14.5)
EST. GFR  (NO RACE VARIABLE): >60 ML/MIN/1.73 M^2
ESTIMATED AVG GLUCOSE: 120 MG/DL (ref 68–131)
FSH SERPL-ACNC: 7.67 MIU/ML (ref 0.95–11.95)
GLUCOSE SERPL-MCNC: 101 MG/DL (ref 70–110)
HBA1C MFR BLD: 5.8 % (ref 4–5.6)
HCT VFR BLD AUTO: 43.1 % (ref 40–54)
HDLC SERPL-MCNC: 40 MG/DL (ref 40–75)
HDLC SERPL: 29.2 % (ref 20–50)
HGB BLD-MCNC: 13.7 G/DL (ref 14–18)
IMM GRANULOCYTES # BLD AUTO: 0.01 K/UL (ref 0–0.04)
IMM GRANULOCYTES NFR BLD AUTO: 0.2 % (ref 0–0.5)
LDLC SERPL CALC-MCNC: 76.8 MG/DL (ref 63–159)
LH SERPL-ACNC: 2.2 MIU/ML (ref 0.6–12.1)
LYMPHOCYTES # BLD AUTO: 1.8 K/UL (ref 1–4.8)
LYMPHOCYTES NFR BLD: 34.1 % (ref 18–48)
MCH RBC QN AUTO: 28.2 PG (ref 27–31)
MCHC RBC AUTO-ENTMCNC: 31.8 G/DL (ref 32–36)
MCV RBC AUTO: 89 FL (ref 82–98)
MONOCYTES # BLD AUTO: 0.5 K/UL (ref 0.3–1)
MONOCYTES NFR BLD: 8.8 % (ref 4–15)
NEUTROPHILS # BLD AUTO: 2.8 K/UL (ref 1.8–7.7)
NEUTROPHILS NFR BLD: 52.8 % (ref 38–73)
NONHDLC SERPL-MCNC: 97 MG/DL
NRBC BLD-RTO: 0 /100 WBC
PLATELET # BLD AUTO: 243 K/UL (ref 150–450)
PMV BLD AUTO: 9.6 FL (ref 9.2–12.9)
POTASSIUM SERPL-SCNC: 4.5 MMOL/L (ref 3.5–5.1)
PROT SERPL-MCNC: 6.8 G/DL (ref 6–8.4)
RBC # BLD AUTO: 4.86 M/UL (ref 4.6–6.2)
SODIUM SERPL-SCNC: 142 MMOL/L (ref 136–145)
T4 FREE SERPL-MCNC: 0.95 NG/DL (ref 0.71–1.51)
TRIGL SERPL-MCNC: 101 MG/DL (ref 30–150)
TSH SERPL DL<=0.005 MIU/L-ACNC: 4.93 UIU/ML (ref 0.4–4)
WBC # BLD AUTO: 5.37 K/UL (ref 3.9–12.7)

## 2023-09-06 PROCEDURE — 84439 ASSAY OF FREE THYROXINE: CPT | Performed by: INTERNAL MEDICINE

## 2023-09-06 PROCEDURE — 83001 ASSAY OF GONADOTROPIN (FSH): CPT | Performed by: INTERNAL MEDICINE

## 2023-09-06 PROCEDURE — 84403 ASSAY OF TOTAL TESTOSTERONE: CPT | Performed by: INTERNAL MEDICINE

## 2023-09-06 PROCEDURE — 84270 ASSAY OF SEX HORMONE GLOBUL: CPT | Performed by: INTERNAL MEDICINE

## 2023-09-06 PROCEDURE — 80053 COMPREHEN METABOLIC PANEL: CPT | Performed by: INTERNAL MEDICINE

## 2023-09-06 PROCEDURE — 84443 ASSAY THYROID STIM HORMONE: CPT | Performed by: INTERNAL MEDICINE

## 2023-09-06 PROCEDURE — 85025 COMPLETE CBC W/AUTO DIFF WBC: CPT | Performed by: INTERNAL MEDICINE

## 2023-09-06 PROCEDURE — 80061 LIPID PANEL: CPT | Performed by: INTERNAL MEDICINE

## 2023-09-06 PROCEDURE — 83036 HEMOGLOBIN GLYCOSYLATED A1C: CPT | Performed by: INTERNAL MEDICINE

## 2023-09-06 PROCEDURE — 83002 ASSAY OF GONADOTROPIN (LH): CPT | Performed by: INTERNAL MEDICINE

## 2023-09-07 ENCOUNTER — OFFICE VISIT (OUTPATIENT)
Dept: ENDOCRINOLOGY | Facility: CLINIC | Age: 48
End: 2023-09-07
Payer: COMMERCIAL

## 2023-09-07 DIAGNOSIS — G47.33 OBSTRUCTIVE SLEEP APNEA: ICD-10-CM

## 2023-09-07 DIAGNOSIS — R10.84 ABDOMINAL PAIN, GENERALIZED: ICD-10-CM

## 2023-09-07 DIAGNOSIS — E29.1 MALE HYPOGONADISM: ICD-10-CM

## 2023-09-07 DIAGNOSIS — E03.9 ACQUIRED HYPOTHYROIDISM: Primary | ICD-10-CM

## 2023-09-07 DIAGNOSIS — K90.0 CELIAC DISEASE: ICD-10-CM

## 2023-09-07 PROCEDURE — 3044F PR MOST RECENT HEMOGLOBIN A1C LEVEL <7.0%: ICD-10-PCS | Mod: CPTII,95,, | Performed by: INTERNAL MEDICINE

## 2023-09-07 PROCEDURE — 99214 OFFICE O/P EST MOD 30 MIN: CPT | Mod: 95,,, | Performed by: INTERNAL MEDICINE

## 2023-09-07 PROCEDURE — 3044F HG A1C LEVEL LT 7.0%: CPT | Mod: CPTII,95,, | Performed by: INTERNAL MEDICINE

## 2023-09-07 PROCEDURE — 1159F PR MEDICATION LIST DOCUMENTED IN MEDICAL RECORD: ICD-10-PCS | Mod: CPTII,95,, | Performed by: INTERNAL MEDICINE

## 2023-09-07 PROCEDURE — 1160F RVW MEDS BY RX/DR IN RCRD: CPT | Mod: CPTII,95,, | Performed by: INTERNAL MEDICINE

## 2023-09-07 PROCEDURE — 1159F MED LIST DOCD IN RCRD: CPT | Mod: CPTII,95,, | Performed by: INTERNAL MEDICINE

## 2023-09-07 PROCEDURE — 99214 PR OFFICE/OUTPT VISIT, EST, LEVL IV, 30-39 MIN: ICD-10-PCS | Mod: 95,,, | Performed by: INTERNAL MEDICINE

## 2023-09-07 PROCEDURE — 1160F PR REVIEW ALL MEDS BY PRESCRIBER/CLIN PHARMACIST DOCUMENTED: ICD-10-PCS | Mod: CPTII,95,, | Performed by: INTERNAL MEDICINE

## 2023-09-07 RX ORDER — LEVOTHYROXINE SODIUM 200 UG/1
200 TABLET ORAL
Qty: 90 TABLET | Refills: 3 | Status: SHIPPED | OUTPATIENT
Start: 2023-09-07

## 2023-09-07 RX ORDER — CLOMIPHENE CITRATE 50 MG/1
50 TABLET ORAL EVERY OTHER DAY
Qty: 45 TABLET | Refills: 3 | Status: SHIPPED | OUTPATIENT
Start: 2023-09-07 | End: 2023-12-06 | Stop reason: SDUPTHER

## 2023-09-07 NOTE — Clinical Note
Needs 8 AM labs drawn in 6 weeks, follow up in 6 months (CBC, CMP, testosterone panel, LH, FSH, TSH, lipid panel, and A1c)

## 2023-09-07 NOTE — ASSESSMENT & PLAN NOTE
--Patient with male hypogonadism  --Initial evaluation was done at  and included a normal pituitary MRI  --Likely multifactorial due to XRT, chemotherapy, weight gain and CARLOS  --His T levels are normal on Clomid  --He would like to have his T levels tested off of Clomid after he loses another 20-25 lbs and if levels remain low he would like to start topical testosterone therapy  --Will continue Clomid for now

## 2023-09-07 NOTE — ASSESSMENT & PLAN NOTE
--Patient with hypothyroidism secondary to XRT  --TPO negative  --TSH high normal so will increase levothyroxine to 200 mcg once daily  --Repeat TSH in 6 weeks

## 2023-09-07 NOTE — PROGRESS NOTES
Subjective:      Patient ID: Ivan Rodriguez is a 48 y.o. male.    Chief Complaint:  Hypothyroidism    The patient location is: Home  The chief complaint leading to consultation is: Hypothyroidism    Visit type: audiovisual    Face to Face time with patient: 15 min  25 minutes of total time spent on the encounter, which includes face to face time and non-face to face time preparing to see the patient (eg, review of tests), Obtaining and/or reviewing separately obtained history, Documenting clinical information in the electronic or other health record, Independently interpreting results (not separately reported) and communicating results to the patient/family/caregiver, or Care coordination (not separately reported).     Each patient to whom he or she provides medical services by telemedicine is:  (1) informed of the relationship between the physician and patient and the respective role of any other health care provider with respect to management of the patient; and (2) notified that he or she may decline to receive medical services by telemedicine and may withdraw from such care at any time.      History of Present Illness  Mr. Rodriguez presents for follow up of hypothyroidism and hypogonadism. Last visit 7/2021.      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      NSCHL, stage IIB diagnosed in 1996; s/p 6 cycles ABVD followed by consolidative mediastinal XRT; his treatment was at [facility] (medical oncologist - Dr. Alex Maddox, radiation oncologist - Dr. Dillon North).     In regards to hypothyroidism:  Felt to be related to XRT therapy for treatment of Hodgkin's lymphoma. Has been on thyroid hormone for several years.   His current thyroid hormone dose is 175 mcg once daily of levothyroxine  He takes thyroid hormone on empty stomach and doesn't miss any doses. He has been taking the thyroid hormone within 1.5-2  hrs of vitamins and supplements.       Latest Reference Range & Units 09/06/23 07:38   TSH 0.400 - 4.000 uIU/mL 4.935 (H)       In regards to hypogonadism:  Diagnosis was made by Dr. Melara at . Reports having full workup there that included a pituitary MRI.   He was recently  and was actively trying to have children so he was started on Clomid. His wife is currently 6 weeks pregnant.   Current Clomid dose is 50 mg every other day  Previous total T level off of Clomid was 300.  He reports that the hypogonadal symptoms started after he had complications from a hernia surgery and gained weight.   The symptoms are improved while on Clomid and his T levels are normal on Clomid.   He also had semen analysis done which was normal.   No illicit drug use. No supplements or creams. No lavender or tea tree oil. Prolactin level was normal.     He is planning on losing more weight and would like to have his testosterone levels retested off of Clomid once he loses another 20 lbs. If still low at that time he would like to start topical testosterone and stop Clomid.       Latest Reference Range & Units 03/09/23 08:35 09/06/23 07:38   FSH 0.95 - 11.95 mIU/mL 7.31 7.67   LH 0.6 - 12.1 mIU/mL 1.5 2.2   Sex Hormone Binding Globulin 10 - 50 nmol/L 15    Testosterone 250 - 1100 ng/dL 308    Testosterone, Bioavailable 110.0 - 575.0 ng/dL 141.4    Testosterone, Free 46.0 - 224.0 pg/mL 68.8        In regards to Celiac's disease:  Diagnosed by gastroenterology at  with small bowel biopsy and positive antibodies confirming diagnosis.   He has been doing his best to adhere to a gluten free diet.      Also with CARLOS and wears CPAP regularly.        He has been on a regular diet and exercise regimen.     Review of Systems   Constitutional:  Negative for chills and fever.   Gastrointestinal:  Negative for nausea and vomiting.       Objective:   Physical Exam  Constitutional:       General: He is not in acute distress.     Appearance:  Normal appearance. He is not ill-appearing.   Neurological:      Mental Status: He is alert.       BP Readings from Last 3 Encounters:   03/08/23 131/85   07/12/22 125/80   03/24/22 128/84     Wt Readings from Last 1 Encounters:   03/08/23 1512 90.3 kg (199 lb)       There is no height or weight on file to calculate BMI.    Lab Review:   Lab Results   Component Value Date    HGBA1C 5.8 (H) 09/06/2023     Lab Results   Component Value Date    CHOL 137 09/06/2023    HDL 40 09/06/2023    LDLCALC 76.8 09/06/2023    TRIG 101 09/06/2023    CHOLHDL 29.2 09/06/2023     Lab Results   Component Value Date     09/06/2023    K 4.5 09/06/2023     09/06/2023    CO2 26 09/06/2023     09/06/2023    BUN 18 09/06/2023    CREATININE 1.0 09/06/2023    CALCIUM 8.6 (L) 09/06/2023    PROT 6.8 09/06/2023    ALBUMIN 3.8 09/06/2023    BILITOT 0.7 09/06/2023    ALKPHOS 55 09/06/2023    AST 32 09/06/2023    ALT 32 09/06/2023    ANIONGAP 8 09/06/2023    ESTGFRAFRICA >60.0 06/13/2022    EGFRNONAA >60.0 06/13/2022    TSH 4.935 (H) 09/06/2023         Assessment and Plan     Acquired hypothyroidism  --Patient with hypothyroidism secondary to XRT  --TPO negative  --TSH high normal so will increase levothyroxine to 200 mcg once daily  --Repeat TSH in 6 weeks    Male hypogonadism  --Patient with male hypogonadism  --Initial evaluation was done at  and included a normal pituitary MRI  --Likely multifactorial due to XRT, chemotherapy, weight gain and CARLOS  --His T levels are normal on Clomid  --He would like to have his T levels tested off of Clomid after he loses another 20-25 lbs and if levels remain low he would like to start topical testosterone therapy  --Will continue Clomid for now    Obstructive sleep apnea  --He is compliant with CPAP    Abdominal pain, generalized  --He is compliant with CPAP    Celiac disease  --Patient to continue gluten free diet      Needs 8 AM labs drawn in 6 weeks, follow up in 6 months      Rudolph  [facility] Staff Endocrinology

## 2023-09-13 LAB
ALBUMIN SERPL-MCNC: 3.8 G/DL (ref 3.6–5.1)
SHBG SERPL-SCNC: 24 NMOL/L (ref 10–50)
TESTOST FREE SERPL-MCNC: 70.8 PG/ML (ref 46–224)
TESTOST SERPL-MCNC: 397 NG/DL (ref 250–1100)
TESTOSTERONE.FREE+WB SERPL-MCNC: 124.1 NG/DL (ref 110–575)

## 2023-09-27 ENCOUNTER — LAB VISIT (OUTPATIENT)
Dept: LAB | Facility: HOSPITAL | Age: 48
End: 2023-09-27
Payer: COMMERCIAL

## 2023-09-27 DIAGNOSIS — Z12.9 CANCER SCREENING: ICD-10-CM

## 2023-09-27 DIAGNOSIS — Z12.9 CANCER SCREENING: Primary | ICD-10-CM

## 2023-09-27 LAB — COMPLEXED PSA SERPL-MCNC: 0.96 NG/ML (ref 0–4)

## 2023-09-27 PROCEDURE — 84153 ASSAY OF PSA TOTAL: CPT | Performed by: INTERNAL MEDICINE

## 2023-09-27 PROCEDURE — 36415 COLL VENOUS BLD VENIPUNCTURE: CPT | Performed by: INTERNAL MEDICINE

## 2023-10-15 ENCOUNTER — PATIENT MESSAGE (OUTPATIENT)
Dept: ENDOCRINOLOGY | Facility: CLINIC | Age: 48
End: 2023-10-15
Payer: COMMERCIAL

## 2023-10-15 DIAGNOSIS — R06.02 SOB (SHORTNESS OF BREATH): ICD-10-CM

## 2023-10-15 DIAGNOSIS — E03.9 ACQUIRED HYPOTHYROIDISM: Primary | ICD-10-CM

## 2023-12-01 ENCOUNTER — LAB VISIT (OUTPATIENT)
Dept: LAB | Facility: HOSPITAL | Age: 48
End: 2023-12-01
Attending: INTERNAL MEDICINE
Payer: COMMERCIAL

## 2023-12-01 DIAGNOSIS — E03.9 ACQUIRED HYPOTHYROIDISM: ICD-10-CM

## 2023-12-01 DIAGNOSIS — R06.02 SOB (SHORTNESS OF BREATH): ICD-10-CM

## 2023-12-01 DIAGNOSIS — E29.1 MALE HYPOGONADISM: ICD-10-CM

## 2023-12-01 LAB
ALBUMIN SERPL BCP-MCNC: 3.8 G/DL (ref 3.5–5.2)
ALP SERPL-CCNC: 50 U/L (ref 55–135)
ALT SERPL W/O P-5'-P-CCNC: 31 U/L (ref 10–44)
ANION GAP SERPL CALC-SCNC: 10 MMOL/L (ref 8–16)
AST SERPL-CCNC: 36 U/L (ref 10–40)
BASOPHILS # BLD AUTO: 0.02 K/UL (ref 0–0.2)
BASOPHILS NFR BLD: 0.5 % (ref 0–1.9)
BILIRUB SERPL-MCNC: 1.2 MG/DL (ref 0.1–1)
BUN SERPL-MCNC: 22 MG/DL (ref 6–20)
CALCIUM SERPL-MCNC: 8.7 MG/DL (ref 8.7–10.5)
CHLORIDE SERPL-SCNC: 108 MMOL/L (ref 95–110)
CHOLEST SERPL-MCNC: 124 MG/DL (ref 120–199)
CHOLEST/HDLC SERPL: 3.4 {RATIO} (ref 2–5)
CO2 SERPL-SCNC: 24 MMOL/L (ref 23–29)
CREAT SERPL-MCNC: 1.2 MG/DL (ref 0.5–1.4)
DIFFERENTIAL METHOD: ABNORMAL
EOSINOPHIL # BLD AUTO: 0.1 K/UL (ref 0–0.5)
EOSINOPHIL NFR BLD: 1.4 % (ref 0–8)
ERYTHROCYTE [DISTWIDTH] IN BLOOD BY AUTOMATED COUNT: 13.2 % (ref 11.5–14.5)
EST. GFR  (NO RACE VARIABLE): >60 ML/MIN/1.73 M^2
ESTIMATED AVG GLUCOSE: 111 MG/DL (ref 68–131)
FSH SERPL-ACNC: 7.16 MIU/ML (ref 0.95–11.95)
GLUCOSE SERPL-MCNC: 124 MG/DL (ref 70–110)
HBA1C MFR BLD: 5.5 % (ref 4–5.6)
HCT VFR BLD AUTO: 41.1 % (ref 40–54)
HDLC SERPL-MCNC: 37 MG/DL (ref 40–75)
HDLC SERPL: 29.8 % (ref 20–50)
HGB BLD-MCNC: 13.7 G/DL (ref 14–18)
IMM GRANULOCYTES # BLD AUTO: 0.01 K/UL (ref 0–0.04)
IMM GRANULOCYTES NFR BLD AUTO: 0.2 % (ref 0–0.5)
LDLC SERPL CALC-MCNC: 64.8 MG/DL (ref 63–159)
LH SERPL-ACNC: 1.3 MIU/ML (ref 0.6–12.1)
LYMPHOCYTES # BLD AUTO: 1.6 K/UL (ref 1–4.8)
LYMPHOCYTES NFR BLD: 36.9 % (ref 18–48)
MCH RBC QN AUTO: 29.5 PG (ref 27–31)
MCHC RBC AUTO-ENTMCNC: 33.3 G/DL (ref 32–36)
MCV RBC AUTO: 88 FL (ref 82–98)
MONOCYTES # BLD AUTO: 0.4 K/UL (ref 0.3–1)
MONOCYTES NFR BLD: 8 % (ref 4–15)
NEUTROPHILS # BLD AUTO: 2.3 K/UL (ref 1.8–7.7)
NEUTROPHILS NFR BLD: 53 % (ref 38–73)
NONHDLC SERPL-MCNC: 87 MG/DL
NRBC BLD-RTO: 0 /100 WBC
PLATELET # BLD AUTO: 205 K/UL (ref 150–450)
PMV BLD AUTO: 9.5 FL (ref 9.2–12.9)
POTASSIUM SERPL-SCNC: 4.5 MMOL/L (ref 3.5–5.1)
PROT SERPL-MCNC: 6.6 G/DL (ref 6–8.4)
RBC # BLD AUTO: 4.65 M/UL (ref 4.6–6.2)
SODIUM SERPL-SCNC: 142 MMOL/L (ref 136–145)
TRIGL SERPL-MCNC: 111 MG/DL (ref 30–150)
TSH SERPL DL<=0.005 MIU/L-ACNC: 0.58 UIU/ML (ref 0.4–4)
WBC # BLD AUTO: 4.39 K/UL (ref 3.9–12.7)

## 2023-12-01 PROCEDURE — 83002 ASSAY OF GONADOTROPIN (LH): CPT | Performed by: INTERNAL MEDICINE

## 2023-12-01 PROCEDURE — 83036 HEMOGLOBIN GLYCOSYLATED A1C: CPT | Performed by: INTERNAL MEDICINE

## 2023-12-01 PROCEDURE — 36415 COLL VENOUS BLD VENIPUNCTURE: CPT | Performed by: INTERNAL MEDICINE

## 2023-12-01 PROCEDURE — 84260 ASSAY OF SEROTONIN: CPT | Performed by: INTERNAL MEDICINE

## 2023-12-01 PROCEDURE — 85025 COMPLETE CBC W/AUTO DIFF WBC: CPT | Performed by: INTERNAL MEDICINE

## 2023-12-01 PROCEDURE — 82040 ASSAY OF SERUM ALBUMIN: CPT | Performed by: INTERNAL MEDICINE

## 2023-12-01 PROCEDURE — 80061 LIPID PANEL: CPT | Performed by: INTERNAL MEDICINE

## 2023-12-01 PROCEDURE — 80053 COMPREHEN METABOLIC PANEL: CPT | Performed by: INTERNAL MEDICINE

## 2023-12-01 PROCEDURE — 84443 ASSAY THYROID STIM HORMONE: CPT | Performed by: INTERNAL MEDICINE

## 2023-12-01 PROCEDURE — 83001 ASSAY OF GONADOTROPIN (FSH): CPT | Performed by: INTERNAL MEDICINE

## 2023-12-06 DIAGNOSIS — E29.1 MALE HYPOGONADISM: ICD-10-CM

## 2023-12-06 RX ORDER — CLOMIPHENE CITRATE 50 MG/1
50 TABLET ORAL EVERY OTHER DAY
Qty: 45 TABLET | Refills: 3 | Status: SHIPPED | OUTPATIENT
Start: 2023-12-06

## 2023-12-07 LAB
ALBUMIN SERPL-MCNC: 4.1 G/DL (ref 3.6–5.1)
SHBG SERPL-SCNC: 28 NMOL/L (ref 10–50)
TESTOST FREE SERPL-MCNC: 80.5 PG/ML (ref 46–224)
TESTOST SERPL-MCNC: 504 NG/DL (ref 250–1100)
TESTOSTERONE.FREE+WB SERPL-MCNC: 151.6 NG/DL

## 2023-12-12 LAB — SEROTONIN: 100 NG/ML

## 2023-12-14 ENCOUNTER — PATIENT MESSAGE (OUTPATIENT)
Dept: ENDOCRINOLOGY | Facility: CLINIC | Age: 48
End: 2023-12-14
Payer: COMMERCIAL

## 2023-12-14 DIAGNOSIS — E29.1 MALE HYPOGONADISM: ICD-10-CM

## 2023-12-14 DIAGNOSIS — E03.9 ACQUIRED HYPOTHYROIDISM: Primary | ICD-10-CM

## 2023-12-14 DIAGNOSIS — R73.03 PREDIABETES: ICD-10-CM

## 2024-03-12 ENCOUNTER — OFFICE VISIT (OUTPATIENT)
Dept: GASTROENTEROLOGY | Facility: CLINIC | Age: 49
End: 2024-03-12
Payer: COMMERCIAL

## 2024-03-12 ENCOUNTER — LAB VISIT (OUTPATIENT)
Dept: LAB | Facility: HOSPITAL | Age: 49
End: 2024-03-12
Attending: STUDENT IN AN ORGANIZED HEALTH CARE EDUCATION/TRAINING PROGRAM
Payer: COMMERCIAL

## 2024-03-12 VITALS
DIASTOLIC BLOOD PRESSURE: 78 MMHG | BODY MASS INDEX: 29.68 KG/M2 | HEART RATE: 85 BPM | HEIGHT: 69 IN | SYSTOLIC BLOOD PRESSURE: 116 MMHG | WEIGHT: 200.38 LBS

## 2024-03-12 DIAGNOSIS — Z86.010 HISTORY OF COLON POLYPS: ICD-10-CM

## 2024-03-12 DIAGNOSIS — K90.41 NCGS (NON-CELIAC GLUTEN SENSITIVITY): Primary | ICD-10-CM

## 2024-03-12 PROCEDURE — 3078F DIAST BP <80 MM HG: CPT | Mod: CPTII,S$GLB,, | Performed by: STUDENT IN AN ORGANIZED HEALTH CARE EDUCATION/TRAINING PROGRAM

## 2024-03-12 PROCEDURE — 99999 PR PBB SHADOW E&M-EST. PATIENT-LVL III: CPT | Mod: PBBFAC,,, | Performed by: STUDENT IN AN ORGANIZED HEALTH CARE EDUCATION/TRAINING PROGRAM

## 2024-03-12 PROCEDURE — 1159F MED LIST DOCD IN RCRD: CPT | Mod: CPTII,S$GLB,, | Performed by: STUDENT IN AN ORGANIZED HEALTH CARE EDUCATION/TRAINING PROGRAM

## 2024-03-12 PROCEDURE — 3074F SYST BP LT 130 MM HG: CPT | Mod: CPTII,S$GLB,, | Performed by: STUDENT IN AN ORGANIZED HEALTH CARE EDUCATION/TRAINING PROGRAM

## 2024-03-12 PROCEDURE — 3008F BODY MASS INDEX DOCD: CPT | Mod: CPTII,S$GLB,, | Performed by: STUDENT IN AN ORGANIZED HEALTH CARE EDUCATION/TRAINING PROGRAM

## 2024-03-12 PROCEDURE — 86258 DGP ANTIBODY EACH IG CLASS: CPT | Mod: 59 | Performed by: STUDENT IN AN ORGANIZED HEALTH CARE EDUCATION/TRAINING PROGRAM

## 2024-03-12 PROCEDURE — 36415 COLL VENOUS BLD VENIPUNCTURE: CPT | Performed by: STUDENT IN AN ORGANIZED HEALTH CARE EDUCATION/TRAINING PROGRAM

## 2024-03-12 PROCEDURE — 99214 OFFICE O/P EST MOD 30 MIN: CPT | Mod: S$GLB,,, | Performed by: STUDENT IN AN ORGANIZED HEALTH CARE EDUCATION/TRAINING PROGRAM

## 2024-03-12 PROCEDURE — 86364 TISS TRNSGLTMNASE EA IG CLAS: CPT | Performed by: STUDENT IN AN ORGANIZED HEALTH CARE EDUCATION/TRAINING PROGRAM

## 2024-03-12 NOTE — PROGRESS NOTES
"    [facility] Consultation Note    Reason for Consult:  The primary encounter diagnosis was NCGS (non-celiac gluten sensitivity). A diagnosis of History of colon polyps was also pertinent to this visit.    PCP:   No, Primary Doctor       Referring MD:  No referring provider defined for this encounter.    HPI:  This is a 49 y.o. male here for evaluation of abdominal pain.    He notes that in 2013 he had a hernia surgery after which he had taken NSAIDs.  This led to ongoing gastric complaints and stomach ulcers.  He was treated for this, but symptoms persisted.  He also notes being tested and diagnosed for celiac disease.      He was seen in 2020 by my colleague Dr. Rodriguez for similar symptoms.  He had questioned the diagnosis of celiac disease at the visit.  At that time, endoscopic testing was recommended.  EGD/Colonoscopy done in September 2021 notable for a small sliding hiatal hernia and colon polyps, but otherwise normal. Gastric, duodenal and ileal pathology was all normal.  As well, his dissach testing was negative.     At present, he does have various GI symptoms with certain foods.  He describes having loose stools with foods that contain gluten and/or fried foods.  Additionally, he mentions that he had started to lose hair and was seen by a  dermatologist who notes he has an autoimmune disorder where he is "attacking his own follicles."    He does have occasional bloating.      He does tell me that his bowel movements are improved with following a mediterranean diet.    He is not avoiding gluten or dairy at present.  At no point in the past has he ever completely avoided gluten.    He is currently due for a surveillance colonoscopy.    Objective Findings:    Vital Signs:  /78   Pulse 85   Ht 5' 9" (1.753 m)   Wt 90.9 kg (200 lb 6.4 oz)   BMI 29.59 kg/m²   Body mass index is 29.59 kg/m².    Physical Exam:  General Appearance: Well appearing in no acute distress  Abdomen: Soft, " non tender, non distended with positive bowel sounds in all four quadrants. No hepatosplenomegaly, ascites, or mass          Assessment:  1. NCGS (non-celiac gluten sensitivity)    2. History of colon polyps      In brief, the patient is a 48yo man who presents with gluten sensitivity.     He has had formal celiac testing in the past which was inconsistent with celiac disease.  He is currently not avoiding gluten and I suspect may have a gluten sensitivity, as opposed to full blown celiac disease.  I have recommended we check a celiac panel today.    Most of his symptoms seem to be diet related.  I have advised him to adhere to a mediterranean diet given improvement when on this diet.  As well, he will introduce a daily fiber supplement to his diet.      He is due for a surveillance colonoscopy which was ordered today.  Will assess for hemorrhoids at that time.     Follow up if symptoms worsen or fail to improve.      Order summary:  Orders Placed This Encounter    Celiac Disease Panel         Thank you so much for allowing me to participate in the care of Ivan Dunn MD

## 2024-03-15 ENCOUNTER — TELEPHONE (OUTPATIENT)
Dept: ENDOSCOPY | Facility: HOSPITAL | Age: 49
End: 2024-03-15
Payer: COMMERCIAL

## 2024-03-15 LAB
GLIADIN PEPTIDE IGA SER-ACNC: 1.4 U/ML
GLIADIN PEPTIDE IGG SER-ACNC: 0.9 U/ML
IGA SERPL-MCNC: 243 MG/DL (ref 70–400)
TTG IGA SER-ACNC: 0.3 U/ML
TTG IGG SER-ACNC: <0.6 U/ML

## 2024-03-15 NOTE — TELEPHONE ENCOUNTER
"----- Message from Christine Rebollar sent at 3/13/2024  7:14 AM CDT -----  Regarding: FW: Endo Case Request    ----- Message -----  From: Marcos Dunn MD  Sent: 3/12/2024   1:54 PM CDT  To: [facility]  Subject: Endo Case Request                                Procedure: Colonoscopy    Diagnosis: Surveillance colonoscopy - Hx of colon polyps    Procedure Timin-4 weeks    #If within 4 weeks selected, please ciara as high priority#    #If greater than 12 weeks, please select "5-12 weeks" and delay sending until 2 months prior to requested date#     Provider: Myself    Location: [facility]    Additional Scheduling Information: No scheduling concerns    Prep Specifications:Standard prep    Is the patient taking a GLP-1 Agonist:no    Have you attached a patient to this message: yes        "

## 2024-03-15 NOTE — TELEPHONE ENCOUNTER
Contacted the patient to schedule an endoscopy procedure(s) 3/15/24. The patient did not answer the call and left a voice message requesting a call back.

## 2024-03-18 ENCOUNTER — TELEPHONE (OUTPATIENT)
Dept: ENDOSCOPY | Facility: HOSPITAL | Age: 49
End: 2024-03-18
Payer: COMMERCIAL

## 2024-03-18 DIAGNOSIS — Z12.11 ENCOUNTER FOR SCREENING COLONOSCOPY FOR NON-HIGH-RISK PATIENT: Primary | ICD-10-CM

## 2024-03-18 NOTE — TELEPHONE ENCOUNTER
"----- Message from Christine Rebollar sent at 3/13/2024  7:14 AM CDT -----  Regarding: FW: Endo Case Request     ----- Message -----  From: Marcos Dunn MD  Sent: 3/12/2024   1:54 PM CDT  To: [facility]  Subject: Endo Case Request                                 Procedure: Colonoscopy     Diagnosis: Surveillance colonoscopy - Hx of colon polyps     Procedure Timin-4 weeks     #If within 4 weeks selected, please ciara as high priority#     #If greater than 12 weeks, please select "5-12 weeks" and delay sending until 2 months prior to requested date#      Provider: Myself     Location: [facility]     Additional Scheduling Information: No scheduling concerns     Prep Specifications:Standard prep     Is the patient taking a GLP-1 Agonist:no            "

## 2024-03-20 ENCOUNTER — TELEPHONE (OUTPATIENT)
Dept: ENDOSCOPY | Facility: HOSPITAL | Age: 49
End: 2024-03-20
Payer: COMMERCIAL

## 2024-03-20 NOTE — TELEPHONE ENCOUNTER
Spoke to pt to schedule procedure(s) Colonoscopy       Physician to perform procedure(s) Dr. LJ Dunn  Date of Procedure (s) 5/23/24  Arrival Time 12:00 PM  Time of Procedure(s) 1:00 PM   Location of Procedure(s) [place] 2nd Floor  Type of Rx Prep sent to patient: PEG  Instructions provided to patient via [facility]    Patient was informed on the following information and verbalized understanding. Screening questionnaire reviewed with patient and complete. If procedure requires anesthesia, a responsible adult needs to be present to accompany the patient home, patient cannot drive after receiving anesthesia. Appointment details are tentative, especially check-in time. Patient will receive a prep-op call 7 days prior to confirm check-in time for procedure. If applicable the patient should contact their pharmacy to verify Rx for procedure prep is ready for pick-up. Patient was advised to call the scheduling department at [phone] if pharmacy states no Rx is available. Patient was advised to call the endoscopy scheduling department if any questions or concerns arise.      [facility] Scheduling Department

## 2024-03-20 NOTE — TELEPHONE ENCOUNTER
Spoke to pt to schedule procedure(s) Colonoscopy       Physician to perform procedure(s) Dr. LJ Dunn  Date of Procedure (s) 6/7/24  Arrival Time 8:30 AM  Time of Procedure(s) 9:30 AM   Location of Procedure(s) [facility]--Enter at the rear of the building through the emergency department screening station or the Outpatient Registration door, then continue to endoscopy department on the 2nd floor.    Type of Rx Prep sent to patient: PEG  Instructions provided to patient via [facility]    Patient was informed on the following information and verbalized understanding. Screening questionnaire reviewed with patient and complete. If procedure requires anesthesia, a responsible adult needs to be present to accompany the patient home, patient cannot drive after receiving anesthesia. Appointment details are tentative, especially check-in time. Patient will receive a prep-op call 7 days prior to confirm check-in time for procedure. If applicable the patient should contact their pharmacy to verify Rx for procedure prep is ready for pick-up. Patient was advised to call the scheduling department at [phone] if pharmacy states no Rx is available. Patient was advised to call the endoscopy scheduling department if any questions or concerns arise.      [facility] Scheduling Department

## 2024-04-10 ENCOUNTER — LAB VISIT (OUTPATIENT)
Dept: LAB | Facility: HOSPITAL | Age: 49
End: 2024-04-10
Attending: INTERNAL MEDICINE
Payer: COMMERCIAL

## 2024-04-10 DIAGNOSIS — E03.9 ACQUIRED HYPOTHYROIDISM: ICD-10-CM

## 2024-04-10 DIAGNOSIS — R73.03 PREDIABETES: ICD-10-CM

## 2024-04-10 DIAGNOSIS — E29.1 MALE HYPOGONADISM: ICD-10-CM

## 2024-04-10 LAB
ALBUMIN SERPL BCP-MCNC: 3.9 G/DL (ref 3.5–5.2)
ALP SERPL-CCNC: 53 U/L (ref 55–135)
ALT SERPL W/O P-5'-P-CCNC: 20 U/L (ref 10–44)
ANION GAP SERPL CALC-SCNC: 9 MMOL/L (ref 8–16)
AST SERPL-CCNC: 23 U/L (ref 10–40)
BILIRUB SERPL-MCNC: 1.1 MG/DL (ref 0.1–1)
BUN SERPL-MCNC: 20 MG/DL (ref 6–20)
CALCIUM SERPL-MCNC: 9.2 MG/DL (ref 8.7–10.5)
CHLORIDE SERPL-SCNC: 110 MMOL/L (ref 95–110)
CHOLEST SERPL-MCNC: 126 MG/DL (ref 120–199)
CHOLEST/HDLC SERPL: 2.9 {RATIO} (ref 2–5)
CO2 SERPL-SCNC: 24 MMOL/L (ref 23–29)
CREAT SERPL-MCNC: 0.9 MG/DL (ref 0.5–1.4)
ERYTHROCYTE [DISTWIDTH] IN BLOOD BY AUTOMATED COUNT: 12.8 % (ref 11.5–14.5)
EST. GFR  (NO RACE VARIABLE): >60 ML/MIN/1.73 M^2
ESTIMATED AVG GLUCOSE: 117 MG/DL (ref 68–131)
FSH SERPL-ACNC: 9.71 MIU/ML (ref 0.95–11.95)
GLUCOSE SERPL-MCNC: 103 MG/DL (ref 70–110)
HBA1C MFR BLD: 5.7 % (ref 4–5.6)
HCT VFR BLD AUTO: 44.8 % (ref 40–54)
HDLC SERPL-MCNC: 43 MG/DL (ref 40–75)
HDLC SERPL: 34.1 % (ref 20–50)
HGB BLD-MCNC: 14.6 G/DL (ref 14–18)
LDLC SERPL CALC-MCNC: 64.6 MG/DL (ref 63–159)
LH SERPL-ACNC: 4.2 MIU/ML (ref 0.6–12.1)
MCH RBC QN AUTO: 28.5 PG (ref 27–31)
MCHC RBC AUTO-ENTMCNC: 32.6 G/DL (ref 32–36)
MCV RBC AUTO: 87 FL (ref 82–98)
NONHDLC SERPL-MCNC: 83 MG/DL
PLATELET # BLD AUTO: 246 K/UL (ref 150–450)
PMV BLD AUTO: 10 FL (ref 9.2–12.9)
POTASSIUM SERPL-SCNC: 4 MMOL/L (ref 3.5–5.1)
PROT SERPL-MCNC: 6.9 G/DL (ref 6–8.4)
RBC # BLD AUTO: 5.13 M/UL (ref 4.6–6.2)
SODIUM SERPL-SCNC: 143 MMOL/L (ref 136–145)
T4 FREE SERPL-MCNC: 1.22 NG/DL (ref 0.71–1.51)
TRIGL SERPL-MCNC: 92 MG/DL (ref 30–150)
TSH SERPL DL<=0.005 MIU/L-ACNC: 0.06 UIU/ML (ref 0.4–4)
WBC # BLD AUTO: 5.82 K/UL (ref 3.9–12.7)

## 2024-04-10 PROCEDURE — 83001 ASSAY OF GONADOTROPIN (FSH): CPT | Performed by: INTERNAL MEDICINE

## 2024-04-10 PROCEDURE — 83036 HEMOGLOBIN GLYCOSYLATED A1C: CPT | Performed by: INTERNAL MEDICINE

## 2024-04-10 PROCEDURE — 80061 LIPID PANEL: CPT | Performed by: INTERNAL MEDICINE

## 2024-04-10 PROCEDURE — 84270 ASSAY OF SEX HORMONE GLOBUL: CPT | Performed by: INTERNAL MEDICINE

## 2024-04-10 PROCEDURE — 83002 ASSAY OF GONADOTROPIN (LH): CPT | Performed by: INTERNAL MEDICINE

## 2024-04-10 PROCEDURE — 84439 ASSAY OF FREE THYROXINE: CPT | Performed by: INTERNAL MEDICINE

## 2024-04-10 PROCEDURE — 84443 ASSAY THYROID STIM HORMONE: CPT | Performed by: INTERNAL MEDICINE

## 2024-04-10 PROCEDURE — 86038 ANTINUCLEAR ANTIBODIES: CPT | Performed by: INTERNAL MEDICINE

## 2024-04-10 PROCEDURE — 85027 COMPLETE CBC AUTOMATED: CPT | Performed by: INTERNAL MEDICINE

## 2024-04-10 PROCEDURE — 82040 ASSAY OF SERUM ALBUMIN: CPT | Mod: 91 | Performed by: INTERNAL MEDICINE

## 2024-04-10 PROCEDURE — 80053 COMPREHEN METABOLIC PANEL: CPT | Performed by: INTERNAL MEDICINE

## 2024-04-10 PROCEDURE — 36415 COLL VENOUS BLD VENIPUNCTURE: CPT | Performed by: INTERNAL MEDICINE

## 2024-04-11 LAB — ANA SER QL IF: NORMAL

## 2024-04-18 LAB
ALBUMIN SERPL-MCNC: 4.2 G/DL (ref 3.6–5.1)
SHBG SERPL-SCNC: 35 NMOL/L (ref 10–50)
TESTOST FREE SERPL-MCNC: 92.3 PG/ML (ref 46–224)
TESTOST SERPL-MCNC: 667 NG/DL (ref 250–1100)
TESTOSTERONE.FREE+WB SERPL-MCNC: 177.7 NG/DL

## 2024-04-30 ENCOUNTER — OFFICE VISIT (OUTPATIENT)
Dept: ENDOCRINOLOGY | Facility: CLINIC | Age: 49
End: 2024-04-30
Payer: COMMERCIAL

## 2024-04-30 DIAGNOSIS — E29.1 MALE HYPOGONADISM: ICD-10-CM

## 2024-04-30 DIAGNOSIS — K90.41 NON-CELIAC GLUTEN SENSITIVITY: ICD-10-CM

## 2024-04-30 DIAGNOSIS — M25.50 ARTHRALGIA, UNSPECIFIED JOINT: ICD-10-CM

## 2024-04-30 DIAGNOSIS — Z01.00 EYE EXAM, ROUTINE: ICD-10-CM

## 2024-04-30 DIAGNOSIS — G47.33 OBSTRUCTIVE SLEEP APNEA: ICD-10-CM

## 2024-04-30 DIAGNOSIS — E03.9 ACQUIRED HYPOTHYROIDISM: Primary | ICD-10-CM

## 2024-04-30 DIAGNOSIS — L98.9 SKIN LESION: ICD-10-CM

## 2024-04-30 DIAGNOSIS — K90.0 CELIAC DISEASE: ICD-10-CM

## 2024-04-30 PROCEDURE — 3044F HG A1C LEVEL LT 7.0%: CPT | Mod: CPTII,95,, | Performed by: INTERNAL MEDICINE

## 2024-04-30 PROCEDURE — 1159F MED LIST DOCD IN RCRD: CPT | Mod: CPTII,95,, | Performed by: INTERNAL MEDICINE

## 2024-04-30 PROCEDURE — G2211 COMPLEX E/M VISIT ADD ON: HCPCS | Mod: 95,,, | Performed by: INTERNAL MEDICINE

## 2024-04-30 PROCEDURE — 1160F RVW MEDS BY RX/DR IN RCRD: CPT | Mod: CPTII,95,, | Performed by: INTERNAL MEDICINE

## 2024-04-30 PROCEDURE — 99214 OFFICE O/P EST MOD 30 MIN: CPT | Mod: 95,,, | Performed by: INTERNAL MEDICINE

## 2024-04-30 RX ORDER — LEVOTHYROXINE SODIUM 175 UG/1
175 TABLET ORAL
Qty: 90 TABLET | Refills: 3 | Status: SHIPPED | OUTPATIENT
Start: 2024-04-30 | End: 2025-04-30

## 2024-04-30 RX ORDER — CLOMIPHENE CITRATE 50 MG/1
50 TABLET ORAL EVERY OTHER DAY
Qty: 45 TABLET | Refills: 3 | Status: SHIPPED | OUTPATIENT
Start: 2024-04-30 | End: 2024-04-30 | Stop reason: SDUPTHER

## 2024-04-30 RX ORDER — CLOMIPHENE CITRATE 50 MG/1
50 TABLET ORAL EVERY OTHER DAY
Qty: 45 TABLET | Refills: 3 | Status: SHIPPED | OUTPATIENT
Start: 2024-04-30

## 2024-04-30 NOTE — PROGRESS NOTES
Subjective:      Patient ID: Ivan Rodriguez is a 49 y.o. male.    Chief Complaint:  Hypothyroidism and Hypogonadism    The patient location is: Home  The chief complaint leading to consultation is: Hypothyroidism, hypogonadism    Visit type: audiovisual    Face to Face time with patient: 25 min  45 minutes of total time spent on the encounter, which includes face to face time and non-face to face time preparing to see the patient (eg, review of tests), Obtaining and/or reviewing separately obtained history, Documenting clinical information in the electronic or other health record, Independently interpreting results (not separately reported) and communicating results to the patient/family/caregiver, or Care coordination (not separately reported).     Each patient to whom he or she provides medical services by telemedicine is:  (1) informed of the relationship between the physician and patient and the respective role of any other health care provider with respect to management of the patient; and (2) notified that he or she may decline to receive medical services by telemedicine and may withdraw from such care at any time.      History of Present Illness  Mr. Rodriguez presents for follow up of hypothyroidism and hypogonadism. Last visit 9/2023.    Since his last visit he saw dermatology for issues with hair loss and blistering on various parts of his body. He was given a steroid shampoo to take and it was recommended he start Plaquenil. He is unsure the exact diagnosis he was given, but he was advised the hair loss was autoimmune in nature. He notes that the skin findings and hair loss seems to flare after he eats certain foods particularly gluten containing foods. He was seen by gastroenterology and told he does not have Celiac's disease but likely has gluten sensitvity. He also notes overt fatigue and joint pains intermittently and this appears to be associated with eating certain foods.     Since his last visit he  has intentionally lost 20 lbs and plans to lose another 10 to 20 lbs.      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      NSCHL, stage IIB diagnosed in 1996; s/p 6 cycles ABVD followed by consolidative mediastinal XRT; his treatment was at [facility] (medical oncologist - Dr. Alex Maddox, radiation oncologist - Dr. Dillon North).     In regards to hypothyroidism:  Felt to be related to XRT therapy for treatment of Hodgkin's lymphoma. Has been on thyroid hormone for several years.   His current thyroid hormone dose is 175 mcg once daily of levothyroxine. Dose recently decreased when TSH suppressed.   He takes thyroid hormone on empty stomach and doesn't miss any doses.          Latest Reference Range & Units 12/01/23 08:16 04/10/24 08:20   TSH 0.400 - 4.000 uIU/mL 0.579 0.058 (L)   Free T4 0.71 - 1.51 ng/dL  1.22        In regards to hypogonadism:  Diagnosis was made by Dr. Melara at . Reports having full workup there that included a pituitary MRI.   Was interested in fertility which was why Clomid was initially prescribed.   Current Clomid dose is 50 mg every other day  Previous total T level off of Clomid was 300.  He reports that the hypogonadal symptoms started after he had complications from a hernia surgery and gained weight.   The symptoms are improved while on Clomid and his T levels are normal on Clomid.   He also had semen analysis done which was normal.   No illicit drug use. No supplements or creams. No lavender or tea tree oil. Prolactin level was normal.      He is planning on losing more weight and would like to have his testosterone levels retested off of Clomid once he loses another 20 lbs. If still low at that time he would like to start topical testosterone and stop Clomid.       Latest Reference Range & Units 09/06/23 07:38 12/01/23 08:16 04/10/24 08:20   Luteinizing Hormone 0.6 - 12.1  mIU/mL 2.2 1.3 4.2   Testosterone Total 250 - 1100 ng/dL 397 504 667   Testosterone, Bioavailable ng/dL 124.1 151.6 177.7   Testosterone, Free 46.0 - 224.0 pg/mL 70.8 80.5 92.3        Diagnosed with gluten sensitivity but Celiac's negative.     Also with CARLOS and wears CPAP regularly.        He has been on a regular diet and exercise regimen.    Review of Systems   Constitutional:  Negative for chills and fever.   Gastrointestinal:  Negative for nausea and vomiting.       Objective:   Physical Exam  Vitals and nursing note reviewed.       BP Readings from Last 3 Encounters:   03/12/24 116/78   03/08/23 131/85   07/12/22 125/80     Wt Readings from Last 1 Encounters:   03/12/24 1331 90.9 kg (200 lb 6.4 oz)       There is no height or weight on file to calculate BMI.    Lab Review:   Lab Results   Component Value Date    HGBA1C 5.7 (H) 04/10/2024     Lab Results   Component Value Date    CHOL 126 04/10/2024    HDL 43 04/10/2024    LDLCALC 64.6 04/10/2024    TRIG 92 04/10/2024    CHOLHDL 34.1 04/10/2024     Lab Results   Component Value Date     04/10/2024    K 4.0 04/10/2024     04/10/2024    CO2 24 04/10/2024     04/10/2024    BUN 20 04/10/2024    CREATININE 0.9 04/10/2024    CALCIUM 9.2 04/10/2024    PROT 6.9 04/10/2024    ALBUMIN 4.2 04/10/2024    ALBUMIN 3.9 04/10/2024    BILITOT 1.1 (H) 04/10/2024    ALKPHOS 53 (L) 04/10/2024    AST 23 04/10/2024    ALT 20 04/10/2024    ANIONGAP 9 04/10/2024    ESTGFRAFRICA >60.0 06/13/2022    EGFRNONAA >60.0 06/13/2022    TSH 0.058 (L) 04/10/2024         Assessment and Plan     Acquired hypothyroidism  --Patient with hypothyroidism secondary to XRT  --TPO negative  --Continue levothyroxine 175 mcg once daily  --Repeat TSH with next labs    Male hypogonadism  --Patient with male hypogonadism  --Initial evaluation was done at  and included a normal pituitary MRI  --Likely multifactorial due to XRT, chemotherapy, weight gain and CARLOS  --His T levels are normal on  Clomid  --He would like to have his T levels tested off of Clomid after he loses another 20-25 lbs and if levels remain low he would like to start topical testosterone therapy  --Will continue Clomid for now    Non-celiac gluten sensitivity  --Patient to continue gluten free diet    Obstructive sleep apnea  --He is compliant with CPAP      Needs labs done in early AM on June 14th, patient requesting referrals to rheumatology and allergy based on recent findings outlined at beginning of HPI. Also needs referral to ophthal for eye exam prior to initiating hydroxychloroquine.       Visit today included increased complexity associated with the care of the episodic problem hypothyroidism, hypogonadism addressed and managing the longitudinal care of the patient due to the serious and/or complex managed problem(s).       Rudolph Schafer M.D. Staff Endocrinology

## 2024-04-30 NOTE — ASSESSMENT & PLAN NOTE
--Patient with hypothyroidism secondary to XRT  --TPO negative  --Continue levothyroxine 175 mcg once daily  --Repeat TSH with next labs

## 2024-05-30 ENCOUNTER — TELEPHONE (OUTPATIENT)
Dept: ENDOSCOPY | Facility: HOSPITAL | Age: 49
End: 2024-05-30
Payer: COMMERCIAL

## 2024-06-07 ENCOUNTER — ANESTHESIA (OUTPATIENT)
Dept: ENDOSCOPY | Facility: HOSPITAL | Age: 49
End: 2024-06-07
Payer: COMMERCIAL

## 2024-06-07 ENCOUNTER — HOSPITAL ENCOUNTER (OUTPATIENT)
Facility: HOSPITAL | Age: 49
Discharge: HOME | End: 2024-06-07
Attending: STUDENT IN AN ORGANIZED HEALTH CARE EDUCATION/TRAINING PROGRAM | Admitting: STUDENT IN AN ORGANIZED HEALTH CARE EDUCATION/TRAINING PROGRAM
Payer: COMMERCIAL

## 2024-06-07 ENCOUNTER — ANESTHESIA EVENT (OUTPATIENT)
Dept: ENDOSCOPY | Facility: HOSPITAL | Age: 49
End: 2024-06-07
Payer: COMMERCIAL

## 2024-06-07 VITALS
TEMPERATURE: 98 F | SYSTOLIC BLOOD PRESSURE: 126 MMHG | OXYGEN SATURATION: 100 % | HEART RATE: 67 BPM | DIASTOLIC BLOOD PRESSURE: 76 MMHG | RESPIRATION RATE: 18 BRPM

## 2024-06-07 DIAGNOSIS — Z12.11 ENCOUNTER FOR COLORECTAL CANCER SCREENING: ICD-10-CM

## 2024-06-07 DIAGNOSIS — Z12.12 ENCOUNTER FOR COLORECTAL CANCER SCREENING: ICD-10-CM

## 2024-06-07 PROCEDURE — 25000003 PHARM REV CODE 250: Performed by: STUDENT IN AN ORGANIZED HEALTH CARE EDUCATION/TRAINING PROGRAM

## 2024-06-07 PROCEDURE — 37000008 HC ANESTHESIA 1ST 15 MINUTES: Performed by: STUDENT IN AN ORGANIZED HEALTH CARE EDUCATION/TRAINING PROGRAM

## 2024-06-07 PROCEDURE — 37000009 HC ANESTHESIA EA ADD 15 MINS: Performed by: STUDENT IN AN ORGANIZED HEALTH CARE EDUCATION/TRAINING PROGRAM

## 2024-06-07 PROCEDURE — D9220A PRA ANESTHESIA: Mod: ANES,,, | Performed by: ANESTHESIOLOGY

## 2024-06-07 PROCEDURE — 88305 TISSUE EXAM BY PATHOLOGIST: CPT | Performed by: PATHOLOGY

## 2024-06-07 PROCEDURE — 45385 COLONOSCOPY W/LESION REMOVAL: CPT | Mod: 33,,, | Performed by: STUDENT IN AN ORGANIZED HEALTH CARE EDUCATION/TRAINING PROGRAM

## 2024-06-07 PROCEDURE — 88305 TISSUE EXAM BY PATHOLOGIST: CPT | Mod: 26,,, | Performed by: PATHOLOGY

## 2024-06-07 PROCEDURE — 27201089 HC SNARE, DISP (ANY): Performed by: STUDENT IN AN ORGANIZED HEALTH CARE EDUCATION/TRAINING PROGRAM

## 2024-06-07 PROCEDURE — D9220A PRA ANESTHESIA: Mod: CRNA,,, | Performed by: STUDENT IN AN ORGANIZED HEALTH CARE EDUCATION/TRAINING PROGRAM

## 2024-06-07 PROCEDURE — 63600175 PHARM REV CODE 636 W HCPCS: Performed by: STUDENT IN AN ORGANIZED HEALTH CARE EDUCATION/TRAINING PROGRAM

## 2024-06-07 PROCEDURE — 45385 COLONOSCOPY W/LESION REMOVAL: CPT | Mod: PT | Performed by: STUDENT IN AN ORGANIZED HEALTH CARE EDUCATION/TRAINING PROGRAM

## 2024-06-07 RX ORDER — PHENYLEPHRINE HCL IN 0.9% NACL 1 MG/10 ML
SYRINGE (ML) INTRAVENOUS
Status: DISCONTINUED
Start: 2024-06-07 | End: 2024-06-07 | Stop reason: HOSPADM

## 2024-06-07 RX ORDER — SODIUM CHLORIDE 9 MG/ML
INJECTION, SOLUTION INTRAVENOUS CONTINUOUS
Status: DISCONTINUED | OUTPATIENT
Start: 2024-06-07 | End: 2024-06-07 | Stop reason: HOSPADM

## 2024-06-07 RX ORDER — LIDOCAINE HYDROCHLORIDE 20 MG/ML
INJECTION INTRAVENOUS
Status: DISCONTINUED | OUTPATIENT
Start: 2024-06-07 | End: 2024-06-07

## 2024-06-07 RX ORDER — LIDOCAINE HYDROCHLORIDE 20 MG/ML
INJECTION, SOLUTION EPIDURAL; INFILTRATION; INTRACAUDAL; PERINEURAL
Status: DISCONTINUED
Start: 2024-06-07 | End: 2024-06-07 | Stop reason: HOSPADM

## 2024-06-07 RX ORDER — PROPOFOL 10 MG/ML
VIAL (ML) INTRAVENOUS
Status: DISCONTINUED
Start: 2024-06-07 | End: 2024-06-07 | Stop reason: HOSPADM

## 2024-06-07 RX ORDER — PHENYLEPHRINE HYDROCHLORIDE 10 MG/ML
INJECTION INTRAVENOUS
Status: DISCONTINUED | OUTPATIENT
Start: 2024-06-07 | End: 2024-06-07

## 2024-06-07 RX ORDER — PROPOFOL 10 MG/ML
VIAL (ML) INTRAVENOUS
Status: DISCONTINUED | OUTPATIENT
Start: 2024-06-07 | End: 2024-06-07

## 2024-06-07 RX ADMIN — PROPOFOL 80 MG: 10 INJECTION, EMULSION INTRAVENOUS at 09:06

## 2024-06-07 RX ADMIN — PROPOFOL 20 MG: 10 INJECTION, EMULSION INTRAVENOUS at 09:06

## 2024-06-07 RX ADMIN — PROPOFOL 30 MG: 10 INJECTION, EMULSION INTRAVENOUS at 09:06

## 2024-06-07 RX ADMIN — PHENYLEPHRINE HYDROCHLORIDE 100 MCG: 10 INJECTION INTRAVENOUS at 09:06

## 2024-06-07 RX ADMIN — LIDOCAINE HYDROCHLORIDE 100 MG: 20 INJECTION, SOLUTION INTRAVENOUS at 09:06

## 2024-06-07 RX ADMIN — SODIUM CHLORIDE: 0.9 INJECTION, SOLUTION INTRAVENOUS at 09:06

## 2024-06-07 NOTE — OR NURSING
PROCEDURE AND RECOVERY COMPLETED. DR. ROMERO DISCUSSED FINDINGS WITH PATIENT; DISCHARGE INSTRUCTIONS GIVEN AND UNDERSTANDING VERBALIZED; ASSISTED UP WITHOUT UNTOWARD EFFECTS; PATIENT READY DISCHARGE.

## 2024-06-07 NOTE — PROVATION PATIENT INSTRUCTIONS
Discharge Summary/Instructions after an Endoscopic Procedure  Patient Name: Ivan Rodriguez  Patient MRN: [id number]  Patient YOB: 1975  Friday, June 7, 2024  Marcos Dunn MD  Dear patient,  As a result of recent federal legislation (The Federal Cures Act), you may   receive lab or pathology results from your procedure in your [facility]   account before your physician is able to contact you. Your physician or   their representative will relay the results to you with their   recommendations at their soonest availability.  Thank you,  RESTRICTIONS:  During your procedure today, you received medications for sedation.  These   medications may affect your judgment, balance and coordination.  Therefore,   for 24 hours, you have the following restrictions:   - DO NOT drive a car, operate machinery, make legal/financial decisions,   sign important papers or drink alcohol.    ACTIVITY:  Today: no heavy lifting, straining or running due to procedural   sedation/anesthesia.  The following day: return to full activity including work.  DIET:  Eat and drink normally unless instructed otherwise.     TREATMENT FOR COMMON SIDE EFFECTS:  - Mild abdominal pain, nausea, belching, bloating or excessive gas:  rest,   eat lightly and use a heating pad.  - Sore Throat: treat with throat lozenges and/or gargle with warm salt   water.  - Because air was used during the procedure, expelling large amounts of air   from your rectum or belching is normal.  - If a bowel prep was taken, you may not have a bowel movement for 1-3 days.    This is normal.  SYMPTOMS TO WATCH FOR AND REPORT TO YOUR PHYSICIAN:  1. Abdominal pain or bloating, other than gas cramps.  2. Chest pain.  3. Back pain.  4. Signs of infection such as: chills or fever occurring within 24 hours   after the procedure.  5. Rectal bleeding, which would show as bright red, maroon, or black stools.   (A tablespoon of blood from the rectum is not serious, especially  if   hemorrhoids are present.)  6. Vomiting.  7. Weakness or dizziness.  GO DIRECTLY TO THE NEAREST EMERGENCY ROOM IF YOU HAVE ANY OF THE FOLLOWING:      Difficulty breathing              Chills and/or fever over 101 F   Persistent vomiting and/or vomiting blood   Severe abdominal pain   Severe chest pain   Black, tarry stools   Bleeding- more than one tablespoon   Any other symptom or condition that you feel may need urgent attention  Your doctor recommends these additional instructions:  If any biopsies were taken, your doctors clinic will contact you in 1 to 2   weeks with any results.  - Discharge patient to home (ambulatory).   - Patient has a contact number available for emergencies.  The signs and   symptoms of potential delayed complications were discussed with the   patient.  Return to normal activities tomorrow.  Written discharge   instructions were provided to the patient.   - Resume previous diet.   - Continue present medications.   - Return to primary care physician as previously scheduled.   - Repeat colonoscopy in 3 years for surveillance.  For questions, problems or results please call your physician - Marcos Dunn MD at Work:  [phone].  [facility] can be reached at [phone]     IF A COMPLICATION OR EMERGENCY SITUATION ARISES AND YOU ARE UNABLE TO REACH   YOUR PHYSICIAN - GO DIRECTLY TO THE EMERGENCY ROOM.  Marcos Dunn MD  6/7/2024 9:52:43 AM  This report has been verified and signed electronically.  Dear patient,  As a result of recent federal legislation (The Federal Cures Act), you may   receive lab or pathology results from your procedure in your [facility]   account before your physician is able to contact you. Your physician or   their representative will relay the results to you with their   recommendations at their soonest availability.  Thank you,  PROVATION

## 2024-06-07 NOTE — ANESTHESIA POSTPROCEDURE EVALUATION
Anesthesia Post Evaluation    Patient: Ivan Rodriguez    Procedure(s) Performed: Procedure(s) (LRB):  COLONOSCOPY (N/A)    Final Anesthesia Type: general      Patient location during evaluation: GI PACU  Patient participation: Yes- Able to Participate  Level of consciousness: awake and alert and oriented  Post-procedure vital signs: reviewed and stable  Pain management: adequate  Airway patency: patent    PONV status at discharge: No PONV  Anesthetic complications: no      Cardiovascular status: blood pressure returned to baseline and hemodynamically stable  Respiratory status: unassisted, spontaneous ventilation and room air  Hydration status: euvolemic  Follow-up not needed.              Vitals Value Taken Time   /76 06/07/24 1030   Temp 36.7 °C (98.1 °F) 06/07/24 1000   Pulse 67 06/07/24 1030   Resp 18 06/07/24 1030   SpO2 100 % 06/07/24 1030         Event Time   Out of Recovery 10:30:00         Pain/Radha Score: Radha Score: 10 (6/7/2024 10:30 AM)

## 2024-06-07 NOTE — ANESTHESIA PREPROCEDURE EVALUATION
06/07/2024    Pre-operative evaluation for Procedure(s) (LRB):  COLONOSCOPY (N/A)    Ivan Rodriguez is a 49 y.o. male     Patient Active Problem List   Diagnosis    Abdominal pain, generalized    Left inguinal nerve entrapment syndrome    Acquired hypothyroidism    Male hypogonadism    Obstructive sleep apnea    Prediabetes    SOB (shortness of breath)    Other male erectile dysfunction    Non-celiac gluten sensitivity       Review of patient's allergies indicates:  No Known Allergies    No current facility-administered medications on file prior to encounter.     Current Outpatient Medications on File Prior to Encounter   Medication Sig Dispense Refill    sildenafiL (VIAGRA) 50 MG tablet Take 1 tablet (50 mg total) by mouth daily as needed for Erectile Dysfunction. 30 tablet 3           Pre-op Assessment    I have reviewed the Patient Summary Reports.    I have reviewed the NPO Status.   I have reviewed the Medications.     Review of Systems  Anesthesia Hx:  No problems with previous Anesthesia             Denies Family Hx of Anesthesia complications.    Denies Personal Hx of Anesthesia complications.                    Social:  Non-Smoker       Hematology/Oncology:  Hematology Normal   Oncology Normal                                   EENT/Dental:  EENT/Dental Normal           Cardiovascular:  Exercise tolerance: good                                           Pulmonary:        Sleep Apnea                Renal/:  Renal/ Normal                 Hepatic/GI:  Bowel Prep.                Musculoskeletal:  Musculoskeletal Normal                Endocrine:   Hypothyroidism          Dermatological:  Skin Normal        Physical Exam  General: Well nourished, Cooperative, Alert and Oriented    Airway:  Mallampati: II   Mouth Opening: Normal  TM Distance: Normal  Tongue: Normal  Neck ROM: Normal  ROM    Dental:  Intact    Chest/Lungs:  Normal Respiratory Rate    Heart:  Rate: Normal  Rhythm: Regular Rhythm        Anesthesia Plan  Type of Anesthesia, risks & benefits discussed:    Anesthesia Type: Gen Natural Airway  Induction:  IV  Informed Consent: Informed consent signed with the Patient and all parties understand the risks and agree with anesthesia plan.  All questions answered.   ASA Score: 2    Ready For Surgery From Anesthesia Perspective.     .

## 2024-06-07 NOTE — H&P
Short Stay Endoscopy History and Physical    PCP - No, Primary Doctor  Referring Physician - Julia Reyes LPN  No address on file    Procedure - Colonoscopy  ASA - per anesthesia  Mallampati - per anesthesia  History of Anesthesia problems - no  Family history Anesthesia problems -  no   Plan of anesthesia - General    HPI  49 y.o. male  Reason for procedure:   Encounter for screening colonoscopy for non-high-risk patient [Z12.11]  History of colon polyps [Z86.010]        ROS:  Constitutional: No fevers, chills, No weight loss  CV: No chest pain  Pulm: No cough, No shortness of breath  GI: see HPI    Medical History:  has a past medical history of Hodgkin lymphoma, Hypothyroidism, Prediabetes, and Thyroid disease.    Surgical History:  has a past surgical history that includes Hernia repair; Lymph node biopsy (Right, 2003); Hernia mesh removal (09/2019); Esophagogastroduodenoscopy (N/A, 9/29/2021); and Colonoscopy (N/A, 9/29/2021).    Family History: family history includes Kidney disease in his father..    Social History:  reports that he has never smoked. He has never used smokeless tobacco. He reports that he does not drink alcohol and does not use drugs.    Review of patient's allergies indicates:  No Known Allergies    Medications:   Medications Prior to Admission   Medication Sig Dispense Refill Last Dose    clomiPHENE (CLOMID) 50 mg tablet Take 1 tablet (50 mg total) by mouth every other day. 45 tablet 3 6/7/2024    levothyroxine (SYNTHROID, LEVOTHROID) 175 MCG tablet Take 1 tablet (175 mcg total) by mouth before breakfast. 90 tablet 3 6/7/2024    sildenafiL (VIAGRA) 50 MG tablet Take 1 tablet (50 mg total) by mouth daily as needed for Erectile Dysfunction. 30 tablet 3        Physical Exam:    Vital Signs: There were no vitals filed for this visit.    General Appearance: Well appearing in no acute distress  Abdomen: Soft, non tender, non distended with normal bowel sounds, no masses    Labs:  Lab  Results   Component Value Date    WBC 5.82 04/10/2024    HGB 14.6 04/10/2024    HCT 44.8 04/10/2024     04/10/2024    CHOL 126 04/10/2024    TRIG 92 04/10/2024    HDL 43 04/10/2024    ALT 20 04/10/2024    AST 23 04/10/2024     04/10/2024    K 4.0 04/10/2024     04/10/2024    CREATININE 0.9 04/10/2024    BUN 20 04/10/2024    CO2 24 04/10/2024    TSH 0.058 (L) 04/10/2024    PSA 0.96 09/27/2023    INR 1.0 07/24/2019    HGBA1C 5.7 (H) 04/10/2024       I have explained the risks and benefits of this endoscopic procedure to the patient including but not limited to bleeding, inflammation, infection, perforation, and death.      Marcos Dunn MD

## 2024-06-07 NOTE — TRANSFER OF CARE
Anesthesia Transfer of Care Note    Patient: Ivan Rodriguez    Procedure(s) Performed: Procedure(s) (LRB):  COLONOSCOPY (N/A)    Patient location: GI    Anesthesia Type: general    Transport from OR: Transported from OR on room air with adequate spontaneous ventilation    Post pain: adequate analgesia    Post assessment: no apparent anesthetic complications and tolerated procedure well    Post vital signs: stable    Level of consciousness: awake and alert    Nausea/Vomiting: no nausea/vomiting    Complications: none    Transfer of care protocol was followed      Last vitals: Visit Vitals  BP (!) 101/55 (BP Location: Left arm, Patient Position: Lying)   Pulse 63   Temp 36.7 °C (98.1 °F) (Oral)   Resp 18   SpO2 99%

## 2024-06-11 LAB
FINAL PATHOLOGIC DIAGNOSIS: NORMAL
GROSS: NORMAL
Lab: NORMAL

## 2024-07-02 ENCOUNTER — OFFICE VISIT (OUTPATIENT)
Dept: URGENT CARE | Facility: CLINIC | Age: 49
End: 2024-07-02
Payer: COMMERCIAL

## 2024-07-02 VITALS
DIASTOLIC BLOOD PRESSURE: 85 MMHG | BODY MASS INDEX: 28.14 KG/M2 | HEIGHT: 69 IN | TEMPERATURE: 99 F | OXYGEN SATURATION: 95 % | HEART RATE: 91 BPM | SYSTOLIC BLOOD PRESSURE: 132 MMHG | WEIGHT: 190 LBS | RESPIRATION RATE: 20 BRPM

## 2024-07-02 DIAGNOSIS — R09.81 NASAL CONGESTION: ICD-10-CM

## 2024-07-02 DIAGNOSIS — R05.1 ACUTE COUGH: ICD-10-CM

## 2024-07-02 DIAGNOSIS — J01.90 ACUTE BACTERIAL RHINOSINUSITIS: Primary | ICD-10-CM

## 2024-07-02 DIAGNOSIS — B96.89 ACUTE BACTERIAL RHINOSINUSITIS: Primary | ICD-10-CM

## 2024-07-02 LAB
CTP QC/QA: YES
SARS-COV-2 AG RESP QL IA.RAPID: NEGATIVE

## 2024-07-02 RX ORDER — AMOXICILLIN AND CLAVULANATE POTASSIUM 875; 125 MG/1; MG/1
1 TABLET, FILM COATED ORAL EVERY 12 HOURS
Qty: 14 TABLET | Refills: 0 | Status: SHIPPED | OUTPATIENT
Start: 2024-07-02 | End: 2024-07-09

## 2024-07-02 RX ORDER — LEVOCETIRIZINE DIHYDROCHLORIDE 5 MG/1
5 TABLET, FILM COATED ORAL NIGHTLY
Qty: 30 TABLET | Refills: 0 | Status: SHIPPED | OUTPATIENT
Start: 2024-07-02

## 2024-07-02 RX ORDER — FLUTICASONE PROPIONATE 50 MCG
1 SPRAY, SUSPENSION (ML) NASAL DAILY
Qty: 11.1 ML | Refills: 0 | Status: SHIPPED | OUTPATIENT
Start: 2024-07-02

## 2024-07-02 NOTE — PATIENT INSTRUCTIONS
Augmentin twice daily for 7 days.  Flonase and Xyzal daily for sinus.    What care is needed at home?   Call your regular doctor to let them know you were in the ED. Make a follow-up appointment if you were told to.  Try to thin the mucus.  Drink lots of liquids to stay hydrated.  Use a cool mist humidifier to avoid dry air.  Use saline nose drops or a saline nose rinse to relieve stuffiness.  Wash your hands often. This will help keep others healthy.  Do not smoke or be in smoke-filled places. Avoid things that may cause breathing problems like fumes, pollution, dust, and other common allergens and irritants.  You may want to take medicine like ibuprofen, naproxen, or acetaminophen to help with pain.  When do I need to get emergency help?   Return to the ED if:   You have a stiff neck, especially if you also have fever, chills, vomiting, or severe headache  You have trouble thinking clearly.  You have trouble seeing or have double vision.  You have swelling or redness or pain around one or both eyes.  You have a fever of 102°F (38.9°C) or higher, or have shaking chills or sweats.  When do I need to call the doctor?   You have an upset stomach and throwing up.  You have more pain in your face and head.  You are not getting better within 1 to 2 weeks.  You have new or worsening symptoms.  - Rest.    - Drink plenty of fluids.    - Acetaminophen (tylenol) or Ibuprofen (advil,motrin) as directed as needed for fever/pain. Avoid tylenol if you have a history of liver disease. Do not take ibuprofen if you have a history of GI bleeding, kidney disease, or if you take blood thinners.     - Follow up with your PCP or specialty clinic as directed in the next 1-2 weeks if not improved or as needed.  You can call [phone] to schedule an appointment with the appropriate provider.    - Go to the ER or seek medical attention immediately if you develop new or worsening symptoms.     - You must understand that you have received  an Urgent Care treatment only and that you may be released before all of your medical problems are known or treated.   - You, the patient, will arrange for follow up care as instructed.   - If your condition worsens or fails to improve we recommend that you receive another evaluation at the ER immediately or contact your PCP to discuss your concerns or return here.

## 2024-07-02 NOTE — PROGRESS NOTES
"Subjective:      Patient ID: Ivan Rodriguez is a 49 y.o. male.    Vitals:  height is 5' 9.02" (1.753 m) and weight is 86.2 kg (190 lb). His oral temperature is 98.5 °F (36.9 °C). His blood pressure is 132/85 and his pulse is 91. His respiration is 20 and oxygen saturation is 95%.     Chief Complaint: Cough    49-year-old male patient here for nasal congestion, chest congestion, sinus pain, sinus pressure, sore throat ongoing since over the weekend after returning from trip from the beach.  Patient denies any fever, chest pain, shortness of breath, GI complaints, or any other associated symptoms.  Patient states he has not taken any medicines for his symptoms at home.  Patient states he took 2 COVID test at home which were negative.        Constitution: Negative for activity change, appetite change, chills, sweating, fatigue and fever.   HENT:  Positive for ear pain, congestion, sinus pain and sinus pressure. Negative for ear discharge, foreign body in ear, tinnitus, hearing loss, facial trauma, nosebleeds, foreign body in nose, postnasal drip, sore throat, trouble swallowing and voice change.    Neck: Negative for neck pain, neck stiffness and painful lymph nodes.   Cardiovascular:  Negative for chest trauma, chest pain and leg swelling.   Eyes:  Negative for eye trauma, foreign body in eye, eye discharge and eye itching.   Respiratory:  Negative for sleep apnea, chest tightness, cough, sputum production and bloody sputum.    Gastrointestinal:  Negative for abdominal trauma, abdominal pain, abdominal bloating, history of abdominal surgery, nausea, vomiting, hemorrhoids, heartburn and bowel incontinence.   Endocrine: hair loss, cold intolerance and heat intolerance.   Genitourinary:  Negative for dysuria, frequency, urgency, urine decreased, genital sore, scrotal swelling, testicular pain and pelvic pain.   Musculoskeletal:  Negative for pain, trauma, joint pain, joint swelling and abnormal ROM of joint.   Skin:  " Negative for color change, pale, rash, wound, avulsion and hives.   Allergic/Immunologic: Negative for environmental allergies, seasonal allergies, food allergies, eczema and hives.   Neurological:  Negative for dizziness, history of vertigo, light-headedness, passing out, disorientation and altered mental status.   Hematologic/Lymphatic: Negative for swollen lymph nodes, easy bruising/bleeding and trouble clotting. Does not bruise/bleed easily.   Psychiatric/Behavioral:  Negative for altered mental status, disorientation, confusion, agitation and nervous/anxious. The patient is not nervous/anxious.       Objective:     Physical Exam   Constitutional: He is oriented to person, place, and time. He appears well-developed. He is cooperative.  Non-toxic appearance. He does not appear ill. No distress.   HENT:   Head: Normocephalic and atraumatic.   Ears:   Right Ear: Hearing, tympanic membrane, external ear and ear canal normal. impacted cerumen  Left Ear: Hearing, tympanic membrane, external ear and ear canal normal. impacted cerumen  Nose: Congestion present. No mucosal edema, rhinorrhea or nasal deformity. No epistaxis. Right sinus exhibits maxillary sinus tenderness. Right sinus exhibits no frontal sinus tenderness. Left sinus exhibits maxillary sinus tenderness. Left sinus exhibits no frontal sinus tenderness.   Mouth/Throat: Uvula is midline, oropharynx is clear and moist and mucous membranes are normal. No trismus in the jaw. Normal dentition. No uvula swelling. No oropharyngeal exudate, posterior oropharyngeal edema or posterior oropharyngeal erythema.   Mild cerumen both ears. TM visible and intact. Declines having ears flushed today due to sensitivity       Comments: Mild cerumen both ears. TM visible and intact. Declines having ears flushed today due to sensitivity   Eyes: Conjunctivae and lids are normal. No scleral icterus.   Neck: Trachea normal and phonation normal. Neck supple. No edema present. No  [FreeTextEntry1] : 6/22  Ca 11.0, 25D 39, tot chol 276, trig 100, HDL 81, , A1c 5.3%, TSH 2.80\par 10/21 24h urine Ca  128mg/24h\par 6/21: Ca 10.7, PTH 63, tot chol 237, HDL 74, trig 90, \par 5/21: Ca 10.9, Cr 0.66, tot chol 281, HDL 83, trig 96 , TSH 2.40, thyroid Ab neg, 25D 24, A1c 5.1%\par \par bone density, Hologic ([facility]), 6/21\par fem neck T +0.7\par tot hip T +0.5\par L1-L4,  T +0.5 erythema present. No neck rigidity present.   Cardiovascular: Normal rate, regular rhythm, normal heart sounds and normal pulses.   Pulmonary/Chest: Effort normal and breath sounds normal. No stridor. No respiratory distress. He has no decreased breath sounds. He has no wheezes. He has no rhonchi. He has no rales. He exhibits no tenderness.   Abdominal: Normal appearance and bowel sounds are normal. He exhibits no distension and no mass. Soft. There is no abdominal tenderness. There is no rebound, no guarding, no left CVA tenderness and no right CVA tenderness. No hernia.   Musculoskeletal: Normal range of motion.         General: No deformity. Normal range of motion.   Neurological: He is alert and oriented to person, place, and time. He exhibits normal muscle tone. Coordination normal.   Skin: Skin is warm, dry, intact, not diaphoretic and not pale.   Psychiatric: His speech is normal and behavior is normal. Judgment and thought content normal.   Nursing note and vitals reviewed.    Results for orders placed or performed in visit on 07/02/24   SARS Coronavirus 2 Antigen, POCT Manual Read   Result Value Ref Range    SARS Coronavirus 2 Antigen Negative Negative     Acceptable Yes         Assessment:     1. Acute bacterial rhinosinusitis    2. Acute cough    3. Nasal congestion        Plan:       Acute bacterial rhinosinusitis  -     amoxicillin-clavulanate 875-125mg (AUGMENTIN) 875-125 mg per tablet; Take 1 tablet by mouth every 12 (twelve) hours. for 7 days  Dispense: 14 tablet; Refill: 0    Acute cough  -     SARS Coronavirus 2 Antigen, POCT Manual Read    Nasal congestion  -     fluticasone propionate (FLONASE) 50 mcg/actuation nasal spray; 1 spray (50 mcg total) by Each Nostril route once daily.  Dispense: 11.1 mL; Refill: 0  -     levocetirizine (XYZAL) 5 MG tablet; Take 1 tablet (5 mg total) by mouth every evening.  Dispense: 30 tablet; Refill: 0

## 2024-07-17 ENCOUNTER — OFFICE VISIT (OUTPATIENT)
Dept: INTERNAL MEDICINE | Facility: CLINIC | Age: 49
End: 2024-07-17
Payer: COMMERCIAL

## 2024-07-17 VITALS
OXYGEN SATURATION: 97 % | BODY MASS INDEX: 29.52 KG/M2 | DIASTOLIC BLOOD PRESSURE: 82 MMHG | SYSTOLIC BLOOD PRESSURE: 128 MMHG | HEART RATE: 102 BPM | WEIGHT: 199.31 LBS | HEIGHT: 69 IN

## 2024-07-17 DIAGNOSIS — U07.1 COVID-19: Primary | ICD-10-CM

## 2024-07-17 PROCEDURE — 3044F HG A1C LEVEL LT 7.0%: CPT | Mod: CPTII,S$GLB,, | Performed by: FAMILY MEDICINE

## 2024-07-17 PROCEDURE — 3074F SYST BP LT 130 MM HG: CPT | Mod: CPTII,S$GLB,, | Performed by: FAMILY MEDICINE

## 2024-07-17 PROCEDURE — 99999 PR PBB SHADOW E&M-EST. PATIENT-LVL III: CPT | Mod: PBBFAC,,, | Performed by: FAMILY MEDICINE

## 2024-07-17 PROCEDURE — 96372 THER/PROPH/DIAG INJ SC/IM: CPT | Mod: S$GLB,,, | Performed by: FAMILY MEDICINE

## 2024-07-17 PROCEDURE — 3008F BODY MASS INDEX DOCD: CPT | Mod: CPTII,S$GLB,, | Performed by: FAMILY MEDICINE

## 2024-07-17 PROCEDURE — 1159F MED LIST DOCD IN RCRD: CPT | Mod: CPTII,S$GLB,, | Performed by: FAMILY MEDICINE

## 2024-07-17 PROCEDURE — 99203 OFFICE O/P NEW LOW 30 MIN: CPT | Mod: 25,S$GLB,, | Performed by: FAMILY MEDICINE

## 2024-07-17 PROCEDURE — 3079F DIAST BP 80-89 MM HG: CPT | Mod: CPTII,S$GLB,, | Performed by: FAMILY MEDICINE

## 2024-07-17 RX ORDER — BROMPHENIRAMINE MALEATE, PSEUDOEPHEDRINE HYDROCHLORIDE, AND DEXTROMETHORPHAN HYDROBROMIDE 2; 30; 10 MG/5ML; MG/5ML; MG/5ML
5 SYRUP ORAL EVERY 6 HOURS PRN
Qty: 473 ML | Refills: 0 | Status: SHIPPED | OUTPATIENT
Start: 2024-07-17 | End: 2024-08-10

## 2024-07-17 RX ORDER — TRIAMCINOLONE ACETONIDE 40 MG/ML
40 INJECTION, SUSPENSION INTRA-ARTICULAR; INTRAMUSCULAR ONCE
Status: COMPLETED | OUTPATIENT
Start: 2024-07-17 | End: 2024-07-17

## 2024-07-17 RX ADMIN — TRIAMCINOLONE ACETONIDE 40 MG: 40 INJECTION, SUSPENSION INTRA-ARTICULAR; INTRAMUSCULAR at 01:07

## 2024-07-17 NOTE — PROGRESS NOTES
"Subjective:     Patient ID: Ivan Rodriguez is a 49 y.o. male.   Chief Complaint: Cough and Nasal Congestion    HPI:  Patient w/o PCP, seen acutely today for cough and congestion    Reports he had a "chest virus" on 7/2, for which he went to . COVID test at that time was negative. He was treated with augmentin x7 days, flonase, and xyzal. Earlier this week his 5 month old son became ill and was taken for evaluation. He was COVID positive. Last night pt had acute onset of respiratory sx and he is feeling worse this morning. Has not had fever. Has severe cough with thick mucus. Reports he has chronic lung conditions which make it hard for him to clear mucus easily.      Review of Systems   Constitutional:  Negative for chills and fever.   HENT:  Positive for nasal congestion, postnasal drip, rhinorrhea, sinus pressure/congestion, sneezing and sore throat. Negative for ear pain.    Respiratory:  Positive for cough. Negative for shortness of breath and wheezing.    Cardiovascular:  Negative for chest pain and palpitations.          Objective:      Vitals:    07/17/24 1310   BP: 128/82   BP Location: Left arm   Patient Position: Sitting   BP Method: Large (Manual)   Pulse: 102   SpO2: 97%   Weight: 90.4 kg (199 lb 4.7 oz)   Height: 5' 9" (1.753 m)      Physical Exam  Vitals reviewed.   Constitutional:       General: He is not in acute distress.     Appearance: Normal appearance. He is ill-appearing. He is not toxic-appearing.   HENT:      Head: Normocephalic and atraumatic.      Right Ear: Tympanic membrane, ear canal and external ear normal.      Left Ear: Tympanic membrane, ear canal and external ear normal.      Nose: Nose normal.      Mouth/Throat:      Mouth: Mucous membranes are moist.      Pharynx: Oropharynx is clear. Posterior oropharyngeal erythema present. No oropharyngeal exudate.   Cardiovascular:      Rate and Rhythm: Normal rate and regular rhythm.      Pulses: Normal pulses.      Heart sounds: Normal " heart sounds. No murmur heard.     No friction rub. No gallop.   Pulmonary:      Effort: Pulmonary effort is normal. No respiratory distress.      Breath sounds: Normal breath sounds. No stridor. No wheezing, rhonchi or rales.   Abdominal:      General: Abdomen is flat. Bowel sounds are normal.      Palpations: Abdomen is soft.   Musculoskeletal:      Cervical back: Normal range of motion.   Lymphadenopathy:      Cervical: No cervical adenopathy.   Neurological:      General: No focal deficit present.      Mental Status: He is alert and oriented to person, place, and time. Mental status is at baseline.   Psychiatric:         Mood and Affect: Mood normal.         Behavior: Behavior normal.         Thought Content: Thought content normal.         Judgment: Judgment normal.           Assessment:       Problem List Items Addressed This Visit    None  Visit Diagnoses       COVID-19    -  Primary    Relevant Medications    nirmatrelvir-ritonavir 300 mg (150 mg x 2)-100 mg copackaged tablets (EUA)    brompheniramine-pseudoeph-DM (BROMFED DM) 2-30-10 mg/5 mL Syrp    triamcinolone acetonide injection 40 mg (Completed)              Plan:       1. COVID-19  Rapid COVID positive  Rx Paxlovid for acute tx  Sx mgmt w/ Bromfed, may use OTC cough management as well - advised careful use to avoid overlap of DM or pseudoephedrine  Discussed infection control measures for household members  RTC for continued or worsened sx  - nirmatrelvir-ritonavir 300 mg (150 mg x 2)-100 mg copackaged tablets (EUA); Take 3 tablets by mouth 2 (two) times daily for 5 days. Each dose contains 2 nirmatrelvir (pink tablets) and 1 ritonavir (white tablet). Take all 3 tablets together  Dispense: 30 tablet; Refill: 0  - brompheniramine-pseudoeph-DM (BROMFED DM) 2-30-10 mg/5 mL Syrp; Take 5 mLs by mouth every 6 (six) hours as needed (cough).  Dispense: 473 mL; Refill: 0  - triamcinolone acetonide injection 40 mg          Follow up if symptoms worsen or fail  to improve.     Goyo Francisco MD, [facility]  Family Medicine Physician  [facility]  07/17/2024      This note was produced using voice recognition technology. Some typographical or syntax errors may be present, despite best efforts with proofreading.

## 2024-07-22 ENCOUNTER — PATIENT MESSAGE (OUTPATIENT)
Dept: INTERNAL MEDICINE | Facility: CLINIC | Age: 49
End: 2024-07-22
Payer: COMMERCIAL

## 2024-07-22 DIAGNOSIS — U07.1 COVID-19: Primary | ICD-10-CM

## 2024-07-22 RX ORDER — BENZONATATE 100 MG/1
100 CAPSULE ORAL 3 TIMES DAILY PRN
Qty: 30 CAPSULE | Refills: 0 | Status: SHIPPED | OUTPATIENT
Start: 2024-07-22 | End: 2024-08-01

## 2024-07-22 NOTE — TELEPHONE ENCOUNTER
Pt was recently see by Dr. Francisco for covid, states he is feeling better. Pt requesting tessalon perles

## 2024-08-09 ENCOUNTER — OFFICE VISIT (OUTPATIENT)
Dept: OPTOMETRY | Facility: CLINIC | Age: 49
End: 2024-08-09
Payer: COMMERCIAL

## 2024-08-09 DIAGNOSIS — Z01.00 EYE EXAM, ROUTINE: Primary | ICD-10-CM

## 2024-08-09 DIAGNOSIS — H52.203 MYOPIA OF BOTH EYES WITH ASTIGMATISM AND PRESBYOPIA: ICD-10-CM

## 2024-08-09 DIAGNOSIS — H52.13 MYOPIA OF BOTH EYES WITH ASTIGMATISM AND PRESBYOPIA: ICD-10-CM

## 2024-08-09 DIAGNOSIS — H52.4 MYOPIA OF BOTH EYES WITH ASTIGMATISM AND PRESBYOPIA: ICD-10-CM

## 2024-08-09 DIAGNOSIS — Z98.890 HX OF LASIK: ICD-10-CM

## 2024-08-09 PROCEDURE — 99999 PR PBB SHADOW E&M-EST. PATIENT-LVL III: CPT | Mod: PBBFAC,,, | Performed by: OPTOMETRIST

## 2024-08-09 RX ORDER — DUTASTERIDE 0.5 MG/1
CAPSULE, LIQUID FILLED ORAL
COMMUNITY
Start: 2024-06-14

## 2024-08-27 ENCOUNTER — PATIENT MESSAGE (OUTPATIENT)
Dept: ENDOCRINOLOGY | Facility: CLINIC | Age: 49
End: 2024-08-27
Payer: COMMERCIAL

## 2024-08-27 DIAGNOSIS — E29.1 MALE HYPOGONADISM: ICD-10-CM

## 2024-08-27 DIAGNOSIS — E03.9 ACQUIRED HYPOTHYROIDISM: Primary | ICD-10-CM

## 2024-08-30 ENCOUNTER — LAB VISIT (OUTPATIENT)
Dept: LAB | Facility: HOSPITAL | Age: 49
End: 2024-08-30
Attending: INTERNAL MEDICINE
Payer: COMMERCIAL

## 2024-08-30 DIAGNOSIS — E03.9 ACQUIRED HYPOTHYROIDISM: ICD-10-CM

## 2024-08-30 DIAGNOSIS — E29.1 MALE HYPOGONADISM: ICD-10-CM

## 2024-08-30 LAB
ALBUMIN SERPL BCP-MCNC: 3.9 G/DL (ref 3.5–5.2)
ALP SERPL-CCNC: 52 U/L (ref 55–135)
ALT SERPL W/O P-5'-P-CCNC: 22 U/L (ref 10–44)
ANION GAP SERPL CALC-SCNC: 7 MMOL/L (ref 8–16)
AST SERPL-CCNC: 24 U/L (ref 10–40)
BILIRUB SERPL-MCNC: 1.1 MG/DL (ref 0.1–1)
BUN SERPL-MCNC: 15 MG/DL (ref 6–20)
CALCIUM SERPL-MCNC: 8.9 MG/DL (ref 8.7–10.5)
CHLORIDE SERPL-SCNC: 110 MMOL/L (ref 95–110)
CHOLEST SERPL-MCNC: 117 MG/DL (ref 120–199)
CHOLEST/HDLC SERPL: 2.9 {RATIO} (ref 2–5)
CO2 SERPL-SCNC: 27 MMOL/L (ref 23–29)
CREAT SERPL-MCNC: 0.9 MG/DL (ref 0.5–1.4)
ERYTHROCYTE [DISTWIDTH] IN BLOOD BY AUTOMATED COUNT: 13.7 % (ref 11.5–14.5)
EST. GFR  (NO RACE VARIABLE): >60 ML/MIN/1.73 M^2
ESTIMATED AVG GLUCOSE: 117 MG/DL (ref 68–131)
FSH SERPL-ACNC: 8.06 MIU/ML (ref 0.95–11.95)
GLUCOSE SERPL-MCNC: 104 MG/DL (ref 70–110)
HBA1C MFR BLD: 5.7 % (ref 4–5.6)
HCT VFR BLD AUTO: 43.4 % (ref 40–54)
HDLC SERPL-MCNC: 41 MG/DL (ref 40–75)
HDLC SERPL: 35 % (ref 20–50)
HGB BLD-MCNC: 14 G/DL (ref 14–18)
LDLC SERPL CALC-MCNC: 64.4 MG/DL (ref 63–159)
LH SERPL-ACNC: 1.9 MIU/ML (ref 0.6–12.1)
MCH RBC QN AUTO: 29 PG (ref 27–31)
MCHC RBC AUTO-ENTMCNC: 32.3 G/DL (ref 32–36)
MCV RBC AUTO: 90 FL (ref 82–98)
NONHDLC SERPL-MCNC: 76 MG/DL
PLATELET # BLD AUTO: 237 K/UL (ref 150–450)
PMV BLD AUTO: 10.2 FL (ref 9.2–12.9)
POTASSIUM SERPL-SCNC: 4.4 MMOL/L (ref 3.5–5.1)
PROT SERPL-MCNC: 6.6 G/DL (ref 6–8.4)
RBC # BLD AUTO: 4.82 M/UL (ref 4.6–6.2)
SODIUM SERPL-SCNC: 144 MMOL/L (ref 136–145)
T4 FREE SERPL-MCNC: 1.25 NG/DL (ref 0.71–1.51)
TRIGL SERPL-MCNC: 58 MG/DL (ref 30–150)
TSH SERPL DL<=0.005 MIU/L-ACNC: 0.19 UIU/ML (ref 0.4–4)
WBC # BLD AUTO: 4.4 K/UL (ref 3.9–12.7)

## 2024-08-30 PROCEDURE — 83036 HEMOGLOBIN GLYCOSYLATED A1C: CPT | Performed by: INTERNAL MEDICINE

## 2024-08-30 PROCEDURE — 85027 COMPLETE CBC AUTOMATED: CPT | Performed by: INTERNAL MEDICINE

## 2024-08-30 PROCEDURE — 80053 COMPREHEN METABOLIC PANEL: CPT | Performed by: INTERNAL MEDICINE

## 2024-08-30 PROCEDURE — 80061 LIPID PANEL: CPT | Performed by: INTERNAL MEDICINE

## 2024-08-30 PROCEDURE — 86038 ANTINUCLEAR ANTIBODIES: CPT | Performed by: INTERNAL MEDICINE

## 2024-08-30 PROCEDURE — 84270 ASSAY OF SEX HORMONE GLOBUL: CPT | Performed by: INTERNAL MEDICINE

## 2024-08-30 PROCEDURE — 83001 ASSAY OF GONADOTROPIN (FSH): CPT | Performed by: INTERNAL MEDICINE

## 2024-08-30 PROCEDURE — 84439 ASSAY OF FREE THYROXINE: CPT | Performed by: INTERNAL MEDICINE

## 2024-08-30 PROCEDURE — 84443 ASSAY THYROID STIM HORMONE: CPT | Performed by: INTERNAL MEDICINE

## 2024-08-30 PROCEDURE — 83002 ASSAY OF GONADOTROPIN (LH): CPT | Performed by: INTERNAL MEDICINE

## 2024-09-03 LAB — ANA SER QL IF: NORMAL

## 2024-09-06 LAB
ALBUMIN SERPL-MCNC: 3.8 G/DL (ref 3.6–5.1)
SHBG SERPL-SCNC: 43 NMOL/L (ref 10–50)
TESTOST FREE SERPL-MCNC: 61.4 PG/ML (ref 46–224)
TESTOST SERPL-MCNC: 543 NG/DL (ref 250–1100)
TESTOSTERONE.FREE+WB SERPL-MCNC: 107.6 NG/DL

## 2024-09-07 ENCOUNTER — PATIENT MESSAGE (OUTPATIENT)
Dept: ENDOCRINOLOGY | Facility: CLINIC | Age: 49
End: 2024-09-07
Payer: COMMERCIAL

## 2024-09-07 DIAGNOSIS — E03.9 ACQUIRED HYPOTHYROIDISM: ICD-10-CM

## 2024-09-09 RX ORDER — LEVOTHYROXINE SODIUM 175 UG/1
175 TABLET ORAL
Qty: 90 TABLET | Refills: 3 | Status: SHIPPED | OUTPATIENT
Start: 2024-09-09 | End: 2024-09-10 | Stop reason: SDUPTHER

## 2024-09-10 RX ORDER — LEVOTHYROXINE SODIUM 175 UG/1
175 TABLET ORAL
Qty: 90 TABLET | Refills: 3 | Status: SHIPPED | OUTPATIENT
Start: 2024-09-10 | End: 2025-09-10

## 2024-09-24 ENCOUNTER — PATIENT MESSAGE (OUTPATIENT)
Dept: GASTROENTEROLOGY | Facility: CLINIC | Age: 49
End: 2024-09-24
Payer: COMMERCIAL

## 2024-10-31 ENCOUNTER — LAB VISIT (OUTPATIENT)
Dept: LAB | Facility: HOSPITAL | Age: 49
End: 2024-10-31
Attending: INTERNAL MEDICINE
Payer: COMMERCIAL

## 2024-10-31 ENCOUNTER — PROCEDURE VISIT (OUTPATIENT)
Dept: HEPATOLOGY | Facility: CLINIC | Age: 49
End: 2024-10-31
Payer: COMMERCIAL

## 2024-10-31 ENCOUNTER — OFFICE VISIT (OUTPATIENT)
Dept: HEPATOLOGY | Facility: CLINIC | Age: 49
End: 2024-10-31
Payer: COMMERCIAL

## 2024-10-31 VITALS
DIASTOLIC BLOOD PRESSURE: 91 MMHG | BODY MASS INDEX: 28.71 KG/M2 | HEIGHT: 69 IN | WEIGHT: 193.81 LBS | SYSTOLIC BLOOD PRESSURE: 156 MMHG | HEART RATE: 87 BPM | OXYGEN SATURATION: 99 % | TEMPERATURE: 99 F

## 2024-10-31 DIAGNOSIS — K76.0 FATTY LIVER: Primary | ICD-10-CM

## 2024-10-31 DIAGNOSIS — K76.0 FATTY LIVER: ICD-10-CM

## 2024-10-31 LAB
ALBUMIN SERPL BCP-MCNC: 4 G/DL (ref 3.5–5.2)
ALBUMIN SERPL BCP-MCNC: 4 G/DL (ref 3.5–5.2)
ALP SERPL-CCNC: 60 U/L (ref 40–150)
ALP SERPL-CCNC: 60 U/L (ref 40–150)
ALT SERPL W/O P-5'-P-CCNC: 25 U/L (ref 10–44)
ALT SERPL W/O P-5'-P-CCNC: 25 U/L (ref 10–44)
ANION GAP SERPL CALC-SCNC: 7 MMOL/L (ref 8–16)
ANION GAP SERPL CALC-SCNC: 7 MMOL/L (ref 8–16)
AST SERPL-CCNC: 31 U/L (ref 10–40)
AST SERPL-CCNC: 31 U/L (ref 10–40)
BASOPHILS # BLD AUTO: 0.02 K/UL (ref 0–0.2)
BASOPHILS # BLD AUTO: 0.02 K/UL (ref 0–0.2)
BASOPHILS NFR BLD: 0.3 % (ref 0–1.9)
BASOPHILS NFR BLD: 0.3 % (ref 0–1.9)
BILIRUB DIRECT SERPL-MCNC: 0.2 MG/DL (ref 0.1–0.3)
BILIRUB DIRECT SERPL-MCNC: 0.2 MG/DL (ref 0.1–0.3)
BILIRUB SERPL-MCNC: 0.5 MG/DL (ref 0.1–1)
BILIRUB SERPL-MCNC: 0.5 MG/DL (ref 0.1–1)
BUN SERPL-MCNC: 20 MG/DL (ref 6–20)
BUN SERPL-MCNC: 20 MG/DL (ref 6–20)
CALCIUM SERPL-MCNC: 9.2 MG/DL (ref 8.7–10.5)
CALCIUM SERPL-MCNC: 9.2 MG/DL (ref 8.7–10.5)
CHLORIDE SERPL-SCNC: 108 MMOL/L (ref 95–110)
CHLORIDE SERPL-SCNC: 108 MMOL/L (ref 95–110)
CO2 SERPL-SCNC: 26 MMOL/L (ref 23–29)
CO2 SERPL-SCNC: 26 MMOL/L (ref 23–29)
CREAT SERPL-MCNC: 1 MG/DL (ref 0.5–1.4)
CREAT SERPL-MCNC: 1 MG/DL (ref 0.5–1.4)
DIFFERENTIAL METHOD BLD: ABNORMAL
DIFFERENTIAL METHOD BLD: ABNORMAL
EOSINOPHIL # BLD AUTO: 0.1 K/UL (ref 0–0.5)
EOSINOPHIL # BLD AUTO: 0.1 K/UL (ref 0–0.5)
EOSINOPHIL NFR BLD: 1.6 % (ref 0–8)
EOSINOPHIL NFR BLD: 1.6 % (ref 0–8)
ERYTHROCYTE [DISTWIDTH] IN BLOOD BY AUTOMATED COUNT: 13.2 % (ref 11.5–14.5)
ERYTHROCYTE [DISTWIDTH] IN BLOOD BY AUTOMATED COUNT: 13.2 % (ref 11.5–14.5)
EST. GFR  (NO RACE VARIABLE): >60 ML/MIN/1.73 M^2
EST. GFR  (NO RACE VARIABLE): >60 ML/MIN/1.73 M^2
FERRITIN SERPL-MCNC: 108 NG/ML (ref 20–300)
GLUCOSE SERPL-MCNC: 93 MG/DL (ref 70–110)
GLUCOSE SERPL-MCNC: 93 MG/DL (ref 70–110)
HAV IGG SER QL IA: NORMAL
HBV CORE AB SERPL QL IA: NORMAL
HBV SURFACE AB SER-ACNC: <3 MIU/ML
HBV SURFACE AB SER-ACNC: NORMAL M[IU]/ML
HBV SURFACE AG SERPL QL IA: NORMAL
HCT VFR BLD AUTO: 42.4 % (ref 40–54)
HCT VFR BLD AUTO: 42.4 % (ref 40–54)
HGB BLD-MCNC: 13.6 G/DL (ref 14–18)
HGB BLD-MCNC: 13.6 G/DL (ref 14–18)
IGA SERPL-MCNC: 171 MG/DL (ref 40–350)
IMM GRANULOCYTES # BLD AUTO: 0.02 K/UL (ref 0–0.04)
IMM GRANULOCYTES # BLD AUTO: 0.02 K/UL (ref 0–0.04)
IMM GRANULOCYTES NFR BLD AUTO: 0.3 % (ref 0–0.5)
IMM GRANULOCYTES NFR BLD AUTO: 0.3 % (ref 0–0.5)
INR PPP: 1 (ref 0.8–1.2)
INR PPP: 1 (ref 0.8–1.2)
IRON SERPL-MCNC: 86 UG/DL (ref 45–160)
LYMPHOCYTES # BLD AUTO: 2.1 K/UL (ref 1–4.8)
LYMPHOCYTES # BLD AUTO: 2.1 K/UL (ref 1–4.8)
LYMPHOCYTES NFR BLD: 32.6 % (ref 18–48)
LYMPHOCYTES NFR BLD: 32.6 % (ref 18–48)
MCH RBC QN AUTO: 29.2 PG (ref 27–31)
MCH RBC QN AUTO: 29.2 PG (ref 27–31)
MCHC RBC AUTO-ENTMCNC: 32.1 G/DL (ref 32–36)
MCHC RBC AUTO-ENTMCNC: 32.1 G/DL (ref 32–36)
MCV RBC AUTO: 91 FL (ref 82–98)
MCV RBC AUTO: 91 FL (ref 82–98)
MONOCYTES # BLD AUTO: 0.5 K/UL (ref 0.3–1)
MONOCYTES # BLD AUTO: 0.5 K/UL (ref 0.3–1)
MONOCYTES NFR BLD: 7.9 % (ref 4–15)
MONOCYTES NFR BLD: 7.9 % (ref 4–15)
NEUTROPHILS # BLD AUTO: 3.6 K/UL (ref 1.8–7.7)
NEUTROPHILS # BLD AUTO: 3.6 K/UL (ref 1.8–7.7)
NEUTROPHILS NFR BLD: 57.3 % (ref 38–73)
NEUTROPHILS NFR BLD: 57.3 % (ref 38–73)
NRBC BLD-RTO: 0 /100 WBC
NRBC BLD-RTO: 0 /100 WBC
PLATELET # BLD AUTO: 231 K/UL (ref 150–450)
PLATELET # BLD AUTO: 231 K/UL (ref 150–450)
PMV BLD AUTO: 9.8 FL (ref 9.2–12.9)
PMV BLD AUTO: 9.8 FL (ref 9.2–12.9)
POTASSIUM SERPL-SCNC: 3.9 MMOL/L (ref 3.5–5.1)
POTASSIUM SERPL-SCNC: 3.9 MMOL/L (ref 3.5–5.1)
PROT SERPL-MCNC: 7 G/DL (ref 6–8.4)
PROT SERPL-MCNC: 7 G/DL (ref 6–8.4)
PROTHROMBIN TIME: 11.3 SEC (ref 9–12.5)
PROTHROMBIN TIME: 11.3 SEC (ref 9–12.5)
RBC # BLD AUTO: 4.66 M/UL (ref 4.6–6.2)
RBC # BLD AUTO: 4.66 M/UL (ref 4.6–6.2)
SATURATED IRON: 21 % (ref 20–50)
SODIUM SERPL-SCNC: 141 MMOL/L (ref 136–145)
SODIUM SERPL-SCNC: 141 MMOL/L (ref 136–145)
TOTAL IRON BINDING CAPACITY: 417 UG/DL (ref 250–450)
TRANSFERRIN SERPL-MCNC: 282 MG/DL (ref 200–375)
WBC # BLD AUTO: 6.32 K/UL (ref 3.9–12.7)
WBC # BLD AUTO: 6.32 K/UL (ref 3.9–12.7)

## 2024-10-31 PROCEDURE — 3077F SYST BP >= 140 MM HG: CPT | Mod: CPTII,S$GLB,, | Performed by: INTERNAL MEDICINE

## 2024-10-31 PROCEDURE — 87340 HEPATITIS B SURFACE AG IA: CPT | Performed by: INTERNAL MEDICINE

## 2024-10-31 PROCEDURE — 86364 TISS TRNSGLTMNASE EA IG CLAS: CPT | Performed by: INTERNAL MEDICINE

## 2024-10-31 PROCEDURE — 1160F RVW MEDS BY RX/DR IN RCRD: CPT | Mod: CPTII,S$GLB,, | Performed by: INTERNAL MEDICINE

## 2024-10-31 PROCEDURE — 82248 BILIRUBIN DIRECT: CPT | Performed by: INTERNAL MEDICINE

## 2024-10-31 PROCEDURE — 1159F MED LIST DOCD IN RCRD: CPT | Mod: CPTII,S$GLB,, | Performed by: INTERNAL MEDICINE

## 2024-10-31 PROCEDURE — 82728 ASSAY OF FERRITIN: CPT | Performed by: INTERNAL MEDICINE

## 2024-10-31 PROCEDURE — 3080F DIAST BP >= 90 MM HG: CPT | Mod: CPTII,S$GLB,, | Performed by: INTERNAL MEDICINE

## 2024-10-31 PROCEDURE — 99999 PR PBB SHADOW E&M-EST. PATIENT-LVL V: CPT | Mod: PBBFAC,,, | Performed by: INTERNAL MEDICINE

## 2024-10-31 PROCEDURE — 86704 HEP B CORE ANTIBODY TOTAL: CPT | Performed by: INTERNAL MEDICINE

## 2024-10-31 PROCEDURE — 3044F HG A1C LEVEL LT 7.0%: CPT | Mod: CPTII,S$GLB,, | Performed by: INTERNAL MEDICINE

## 2024-10-31 PROCEDURE — 86790 VIRUS ANTIBODY NOS: CPT | Performed by: INTERNAL MEDICINE

## 2024-10-31 PROCEDURE — 80053 COMPREHEN METABOLIC PANEL: CPT | Performed by: INTERNAL MEDICINE

## 2024-10-31 PROCEDURE — 85025 COMPLETE CBC W/AUTO DIFF WBC: CPT | Performed by: INTERNAL MEDICINE

## 2024-10-31 PROCEDURE — 86706 HEP B SURFACE ANTIBODY: CPT | Mod: 91 | Performed by: INTERNAL MEDICINE

## 2024-10-31 PROCEDURE — 82784 ASSAY IGA/IGD/IGG/IGM EACH: CPT | Performed by: INTERNAL MEDICINE

## 2024-10-31 PROCEDURE — 84466 ASSAY OF TRANSFERRIN: CPT | Performed by: INTERNAL MEDICINE

## 2024-10-31 PROCEDURE — 99204 OFFICE O/P NEW MOD 45 MIN: CPT | Mod: S$GLB,,, | Performed by: INTERNAL MEDICINE

## 2024-10-31 PROCEDURE — 85610 PROTHROMBIN TIME: CPT | Performed by: INTERNAL MEDICINE

## 2024-10-31 PROCEDURE — 3008F BODY MASS INDEX DOCD: CPT | Mod: CPTII,S$GLB,, | Performed by: INTERNAL MEDICINE

## 2024-10-31 PROCEDURE — 91200 LIVER ELASTOGRAPHY: CPT | Mod: S$GLB,,, | Performed by: INTERNAL MEDICINE

## 2024-11-04 LAB — TTG IGA SER-ACNC: 0.5 U/ML

## 2024-12-02 ENCOUNTER — PATIENT MESSAGE (OUTPATIENT)
Dept: INTERNAL MEDICINE | Facility: CLINIC | Age: 49
End: 2024-12-02
Payer: COMMERCIAL

## 2024-12-02 NOTE — TELEPHONE ENCOUNTER
Pt recently had labs drawn that were ordered by hepatologist; pt would like for PCP to review the 10/31 Hep B results and advise as to whether or not immunization is needed    LOV 7/17/2024

## 2024-12-09 ENCOUNTER — PATIENT MESSAGE (OUTPATIENT)
Dept: HEPATOLOGY | Facility: CLINIC | Age: 49
End: 2024-12-09
Payer: COMMERCIAL

## 2024-12-30 DIAGNOSIS — E29.1 MALE HYPOGONADISM: ICD-10-CM

## 2024-12-30 RX ORDER — CLOMIPHENE CITRATE 50 MG/1
TABLET ORAL
Qty: 45 TABLET | Refills: 3 | Status: SHIPPED | OUTPATIENT
Start: 2024-12-30

## 2025-04-24 ENCOUNTER — PATIENT MESSAGE (OUTPATIENT)
Dept: HEPATOLOGY | Facility: CLINIC | Age: 50
End: 2025-04-24
Payer: COMMERCIAL

## 2025-04-24 ENCOUNTER — PATIENT MESSAGE (OUTPATIENT)
Dept: ENDOCRINOLOGY | Facility: CLINIC | Age: 50
End: 2025-04-24
Payer: COMMERCIAL

## 2025-05-23 ENCOUNTER — HOSPITAL ENCOUNTER (OUTPATIENT)
Dept: RADIOLOGY | Facility: HOSPITAL | Age: 50
Discharge: HOME | End: 2025-05-23
Attending: INTERNAL MEDICINE
Payer: COMMERCIAL

## 2025-05-23 ENCOUNTER — PATIENT MESSAGE (OUTPATIENT)
Dept: HEPATOLOGY | Facility: CLINIC | Age: 50
End: 2025-05-23
Payer: COMMERCIAL

## 2025-05-23 DIAGNOSIS — K76.0 FATTY LIVER: ICD-10-CM

## 2025-05-23 PROCEDURE — 76700 US EXAM ABDOM COMPLETE: CPT | Mod: 26,,, | Performed by: STUDENT IN AN ORGANIZED HEALTH CARE EDUCATION/TRAINING PROGRAM

## 2025-05-23 PROCEDURE — 76700 US EXAM ABDOM COMPLETE: CPT | Mod: TC

## 2025-05-26 ENCOUNTER — RESULTS FOLLOW-UP (OUTPATIENT)
Dept: TRANSPLANT | Facility: CLINIC | Age: 50
End: 2025-05-26

## 2025-05-26 DIAGNOSIS — K76.9 LIVER LESION: Primary | ICD-10-CM

## 2025-05-27 ENCOUNTER — RESULTS FOLLOW-UP (OUTPATIENT)
Dept: ENDOCRINOLOGY | Facility: CLINIC | Age: 50
End: 2025-05-27

## 2025-05-27 ENCOUNTER — TELEPHONE (OUTPATIENT)
Dept: HEPATOLOGY | Facility: CLINIC | Age: 50
End: 2025-05-27
Payer: COMMERCIAL

## 2025-06-03 ENCOUNTER — PATIENT MESSAGE (OUTPATIENT)
Dept: ENDOCRINOLOGY | Facility: CLINIC | Age: 50
End: 2025-06-03

## 2025-06-03 ENCOUNTER — OFFICE VISIT (OUTPATIENT)
Dept: ENDOCRINOLOGY | Facility: CLINIC | Age: 50
End: 2025-06-03
Payer: COMMERCIAL

## 2025-06-03 DIAGNOSIS — K90.41 NON-CELIAC GLUTEN SENSITIVITY: ICD-10-CM

## 2025-06-03 DIAGNOSIS — E29.1 MALE HYPOGONADISM: ICD-10-CM

## 2025-06-03 DIAGNOSIS — E03.9 ACQUIRED HYPOTHYROIDISM: Primary | ICD-10-CM

## 2025-06-03 DIAGNOSIS — K76.0 FATTY LIVER: ICD-10-CM

## 2025-06-03 DIAGNOSIS — G47.33 OBSTRUCTIVE SLEEP APNEA: ICD-10-CM

## 2025-06-03 PROCEDURE — 1159F MED LIST DOCD IN RCRD: CPT | Mod: CPTII,95,, | Performed by: INTERNAL MEDICINE

## 2025-06-03 PROCEDURE — 3044F HG A1C LEVEL LT 7.0%: CPT | Mod: CPTII,95,, | Performed by: INTERNAL MEDICINE

## 2025-06-03 PROCEDURE — G2211 COMPLEX E/M VISIT ADD ON: HCPCS | Mod: 95,,, | Performed by: INTERNAL MEDICINE

## 2025-06-03 PROCEDURE — 98006 SYNCH AUDIO-VIDEO EST MOD 30: CPT | Mod: 95,,, | Performed by: INTERNAL MEDICINE

## 2025-06-03 PROCEDURE — 99212 OFFICE O/P EST SF 10 MIN: CPT | Performed by: INTERNAL MEDICINE

## 2025-06-03 PROCEDURE — 1160F RVW MEDS BY RX/DR IN RCRD: CPT | Mod: CPTII,95,, | Performed by: INTERNAL MEDICINE

## 2025-06-17 ENCOUNTER — HOSPITAL ENCOUNTER (OUTPATIENT)
Dept: RADIOLOGY | Facility: HOSPITAL | Age: 50
Discharge: HOME | End: 2025-06-17
Attending: INTERNAL MEDICINE
Payer: COMMERCIAL

## 2025-06-17 DIAGNOSIS — K76.9 LIVER LESION: ICD-10-CM

## 2025-06-17 PROCEDURE — A9585 GADOBUTROL INJECTION: HCPCS | Performed by: INTERNAL MEDICINE

## 2025-06-17 PROCEDURE — 74183 MRI ABD W/O CNTR FLWD CNTR: CPT | Mod: TC

## 2025-06-17 PROCEDURE — 74183 MRI ABD W/O CNTR FLWD CNTR: CPT | Mod: 26,,, | Performed by: STUDENT IN AN ORGANIZED HEALTH CARE EDUCATION/TRAINING PROGRAM

## 2025-06-17 PROCEDURE — 25500020 PHARM REV CODE 255: Performed by: INTERNAL MEDICINE

## 2025-06-17 RX ORDER — GADOBUTROL 604.72 MG/ML
10 INJECTION INTRAVENOUS
Status: COMPLETED | OUTPATIENT
Start: 2025-06-17 | End: 2025-06-17

## 2025-06-17 RX ADMIN — GADOBUTROL 10 ML: 604.72 INJECTION INTRAVENOUS at 05:06

## 2025-06-20 ENCOUNTER — TELEPHONE (OUTPATIENT)
Dept: HEPATOLOGY | Facility: CLINIC | Age: 50
End: 2025-06-20
Payer: COMMERCIAL

## 2025-06-20 ENCOUNTER — RESULTS FOLLOW-UP (OUTPATIENT)
Dept: TRANSPLANT | Facility: CLINIC | Age: 50
End: 2025-06-20

## 2025-06-20 DIAGNOSIS — K76.9 LIVER LESION: Primary | ICD-10-CM

## 2025-07-01 DIAGNOSIS — E29.1 MALE HYPOGONADISM: ICD-10-CM

## 2025-07-01 RX ORDER — CLOMIPHENE CITRATE 50 MG/1
TABLET ORAL
Qty: 45 TABLET | Refills: 3 | Status: SHIPPED | OUTPATIENT
Start: 2025-07-01

## 2025-07-12 ENCOUNTER — HOSPITAL ENCOUNTER (OUTPATIENT)
Dept: RADIOLOGY | Facility: HOSPITAL | Age: 50
Discharge: HOME | End: 2025-07-12
Attending: INTERNAL MEDICINE
Payer: COMMERCIAL

## 2025-07-12 DIAGNOSIS — K76.9 LIVER LESION: ICD-10-CM

## 2025-07-12 PROCEDURE — 74160 CT ABDOMEN W/CONTRAST: CPT | Mod: TC

## 2025-07-12 PROCEDURE — 74160 CT ABDOMEN W/CONTRAST: CPT | Mod: 26,,, | Performed by: RADIOLOGY

## 2025-07-12 PROCEDURE — 25500020 PHARM REV CODE 255: Performed by: INTERNAL MEDICINE

## 2025-07-12 RX ADMIN — IOHEXOL 100 ML: 350 INJECTION, SOLUTION INTRAVENOUS at 09:07

## 2025-07-13 DIAGNOSIS — K76.9 LIVER LESION: Primary | ICD-10-CM

## 2025-07-14 ENCOUNTER — PATIENT MESSAGE (OUTPATIENT)
Dept: HEPATOLOGY | Facility: CLINIC | Age: 50
End: 2025-07-14
Payer: COMMERCIAL

## 2025-07-14 ENCOUNTER — TELEPHONE (OUTPATIENT)
Dept: HEPATOLOGY | Facility: CLINIC | Age: 50
End: 2025-07-14
Payer: COMMERCIAL

## 2025-07-14 NOTE — TELEPHONE ENCOUNTER
----- Message from Hilda Medina MD sent at 7/13/2025  8:04 PM CDT -----  Landy  8/25  ----- Message -----  From: Paulino, Rad Results In  Sent: 7/13/2025   4:33 PM CDT  To: Hilda Medina MD

## 2025-08-25 ENCOUNTER — PATIENT MESSAGE (OUTPATIENT)
Dept: ENDOCRINOLOGY | Facility: CLINIC | Age: 50
End: 2025-08-25
Payer: COMMERCIAL